# Patient Record
Sex: FEMALE | Race: WHITE | Employment: FULL TIME | URBAN - METROPOLITAN AREA
[De-identification: names, ages, dates, MRNs, and addresses within clinical notes are randomized per-mention and may not be internally consistent; named-entity substitution may affect disease eponyms.]

---

## 2020-02-26 ENCOUNTER — APPOINTMENT (OUTPATIENT)
Dept: OTHER | Facility: HOSPITAL | Age: 45
End: 2020-02-26
Attending: PREVENTIVE MEDICINE

## 2020-07-01 DIAGNOSIS — Z78.9 PARTICIPANT IN HEALTH AND WELLNESS PLAN: Primary | ICD-10-CM

## 2020-07-03 ENCOUNTER — NURSE ONLY (OUTPATIENT)
Dept: LAB | Age: 45
End: 2020-07-03
Attending: PREVENTIVE MEDICINE

## 2020-07-03 DIAGNOSIS — Z78.9 PARTICIPANT IN HEALTH AND WELLNESS PLAN: ICD-10-CM

## 2020-07-03 PROCEDURE — 86769 SARS-COV-2 COVID-19 ANTIBODY: CPT

## 2020-07-07 LAB — SARS-COV-2 IGG SERPLBLD QL IA.RAPID: NEGATIVE

## 2020-12-23 ENCOUNTER — IMMUNIZATION (OUTPATIENT)
Dept: LAB | Facility: HOSPITAL | Age: 45
End: 2020-12-23
Attending: PREVENTIVE MEDICINE

## 2020-12-23 DIAGNOSIS — Z23 NEED FOR VACCINATION: ICD-10-CM

## 2020-12-23 PROCEDURE — 0001A SARSCOV2 VAC 30MCG/0.3ML IM: CPT

## 2021-01-13 ENCOUNTER — IMMUNIZATION (OUTPATIENT)
Dept: LAB | Facility: HOSPITAL | Age: 46
End: 2021-01-13
Attending: PREVENTIVE MEDICINE

## 2021-01-13 DIAGNOSIS — Z23 NEED FOR VACCINATION: Primary | ICD-10-CM

## 2021-01-13 PROCEDURE — 0002A SARSCOV2 VAC 30MCG/0.3ML IM: CPT

## 2021-11-08 ENCOUNTER — EMPLOYEE HEALTH (OUTPATIENT)
Dept: OTHER | Facility: HOSPITAL | Age: 46
End: 2021-11-08
Attending: PREVENTIVE MEDICINE

## 2021-11-08 DIAGNOSIS — Z01.84 IMMUNITY STATUS TESTING: Primary | ICD-10-CM

## 2021-11-08 DIAGNOSIS — Z11.1 SCREENING-PULMONARY TB: ICD-10-CM

## 2021-11-08 PROCEDURE — 86787 VARICELLA-ZOSTER ANTIBODY: CPT

## 2021-11-08 PROCEDURE — 86480 TB TEST CELL IMMUN MEASURE: CPT

## 2022-02-07 PROCEDURE — 3044F HG A1C LEVEL LT 7.0%: CPT | Performed by: PHYSICIAN ASSISTANT

## 2022-03-16 PROBLEM — M65.241: Status: ACTIVE | Noted: 2019-09-17

## 2022-03-16 PROBLEM — E66.01 MORBID OBESITY DUE TO EXCESS CALORIES (HCC): Status: ACTIVE | Noted: 2017-01-19

## 2022-03-16 PROBLEM — M79.10 MYALGIA: Status: ACTIVE | Noted: 2018-11-05

## 2022-03-16 PROBLEM — E07.9 THYROID DISEASE: Status: ACTIVE | Noted: 2022-03-16

## 2022-03-16 PROBLEM — I10 HYPERTENSION: Status: ACTIVE | Noted: 2022-03-16

## 2022-04-25 ENCOUNTER — OFFICE VISIT (OUTPATIENT)
Dept: FAMILY MEDICINE CLINIC | Facility: CLINIC | Age: 47
End: 2022-04-25
Payer: COMMERCIAL

## 2022-04-25 VITALS
TEMPERATURE: 98 F | BODY MASS INDEX: 46.61 KG/M2 | HEART RATE: 77 BPM | HEIGHT: 66 IN | DIASTOLIC BLOOD PRESSURE: 80 MMHG | WEIGHT: 290 LBS | RESPIRATION RATE: 16 BRPM | SYSTOLIC BLOOD PRESSURE: 130 MMHG | OXYGEN SATURATION: 98 %

## 2022-04-25 DIAGNOSIS — J06.9 UPPER RESPIRATORY INFECTION, ACUTE: Primary | ICD-10-CM

## 2022-04-25 PROCEDURE — 3075F SYST BP GE 130 - 139MM HG: CPT | Performed by: PHYSICIAN ASSISTANT

## 2022-04-25 PROCEDURE — 99202 OFFICE O/P NEW SF 15 MIN: CPT | Performed by: PHYSICIAN ASSISTANT

## 2022-04-25 PROCEDURE — 3079F DIAST BP 80-89 MM HG: CPT | Performed by: PHYSICIAN ASSISTANT

## 2022-04-25 PROCEDURE — 3008F BODY MASS INDEX DOCD: CPT | Performed by: PHYSICIAN ASSISTANT

## 2022-05-21 ENCOUNTER — HOSPITAL ENCOUNTER (EMERGENCY)
Facility: HOSPITAL | Age: 47
Discharge: HOME | End: 2022-05-21
Attending: EMERGENCY MEDICINE
Payer: COMMERCIAL

## 2022-05-21 VITALS
TEMPERATURE: 98 F | RESPIRATION RATE: 17 BRPM | HEIGHT: 66 IN | BODY MASS INDEX: 45.16 KG/M2 | HEART RATE: 66 BPM | DIASTOLIC BLOOD PRESSURE: 69 MMHG | WEIGHT: 281 LBS | OXYGEN SATURATION: 96 % | SYSTOLIC BLOOD PRESSURE: 118 MMHG

## 2022-05-21 DIAGNOSIS — R10.13 EPIGASTRIC PAIN: Primary | ICD-10-CM

## 2022-05-21 DIAGNOSIS — S29.012A STRAIN OF THORACIC BACK REGION: ICD-10-CM

## 2022-05-21 LAB
ALBUMIN SERPL-MCNC: 3.4 G/DL (ref 3.4–5)
ALP LIVER SERPL-CCNC: 97 U/L
ALT SERPL-CCNC: 21 U/L
ANION GAP SERPL CALC-SCNC: 5 MMOL/L (ref 0–18)
AST SERPL-CCNC: 10 U/L (ref 15–37)
B-HCG UR QL: NEGATIVE
BASOPHILS # BLD AUTO: 0.13 X10(3) UL (ref 0–0.2)
BASOPHILS NFR BLD AUTO: 1.5 %
BILIRUB DIRECT SERPL-MCNC: <0.1 MG/DL (ref 0–0.2)
BILIRUB SERPL-MCNC: 0.2 MG/DL (ref 0.1–2)
BILIRUB UR QL: NEGATIVE
BUN BLD-MCNC: 12 MG/DL (ref 7–18)
BUN/CREAT SERPL: 16.9 (ref 10–20)
CALCIUM BLD-MCNC: 9.3 MG/DL (ref 8.5–10.1)
CHLORIDE SERPL-SCNC: 106 MMOL/L (ref 98–112)
CO2 SERPL-SCNC: 29 MMOL/L (ref 21–32)
COLOR UR: YELLOW
CREAT BLD-MCNC: 0.71 MG/DL
DEPRECATED RDW RBC AUTO: 42.7 FL (ref 35.1–46.3)
EOSINOPHIL # BLD AUTO: 0.31 X10(3) UL (ref 0–0.7)
EOSINOPHIL NFR BLD AUTO: 3.7 %
ERYTHROCYTE [DISTWIDTH] IN BLOOD BY AUTOMATED COUNT: 14.6 % (ref 11–15)
GLUCOSE BLD-MCNC: 89 MG/DL (ref 70–99)
GLUCOSE UR-MCNC: NEGATIVE MG/DL
HCT VFR BLD AUTO: 42.1 %
HGB BLD-MCNC: 13.3 G/DL
IMM GRANULOCYTES # BLD AUTO: 0.04 X10(3) UL (ref 0–1)
IMM GRANULOCYTES NFR BLD: 0.5 %
KETONES UR-MCNC: NEGATIVE MG/DL
LEUKOCYTE ESTERASE UR QL STRIP.AUTO: NEGATIVE
LIPASE SERPL-CCNC: 77 U/L (ref 73–393)
LYMPHOCYTES # BLD AUTO: 2.81 X10(3) UL (ref 1–4)
LYMPHOCYTES NFR BLD AUTO: 33.1 %
MCH RBC QN AUTO: 25.7 PG (ref 26–34)
MCHC RBC AUTO-ENTMCNC: 31.6 G/DL (ref 31–37)
MCV RBC AUTO: 81.3 FL
MONOCYTES # BLD AUTO: 0.5 X10(3) UL (ref 0.1–1)
MONOCYTES NFR BLD AUTO: 5.9 %
NEUTROPHILS # BLD AUTO: 4.69 X10 (3) UL (ref 1.5–7.7)
NEUTROPHILS # BLD AUTO: 4.69 X10(3) UL (ref 1.5–7.7)
NEUTROPHILS NFR BLD AUTO: 55.3 %
NITRITE UR QL STRIP.AUTO: NEGATIVE
OSMOLALITY SERPL CALC.SUM OF ELEC: 289 MOSM/KG (ref 275–295)
PH UR: 5 [PH] (ref 5–8)
PLATELET # BLD AUTO: 450 10(3)UL (ref 150–450)
POTASSIUM SERPL-SCNC: 3.8 MMOL/L (ref 3.5–5.1)
PROT SERPL-MCNC: 8.3 G/DL (ref 6.4–8.2)
PROT UR-MCNC: 30 MG/DL
RBC # BLD AUTO: 5.18 X10(6)UL
SODIUM SERPL-SCNC: 140 MMOL/L (ref 136–145)
SP GR UR STRIP: 1.02 (ref 1–1.03)
UROBILINOGEN UR STRIP-ACNC: <2
VIT C UR-MCNC: NEGATIVE MG/DL
WBC # BLD AUTO: 8.5 X10(3) UL (ref 4–11)

## 2022-05-21 PROCEDURE — 99284 EMERGENCY DEPT VISIT MOD MDM: CPT

## 2022-05-21 PROCEDURE — 81001 URINALYSIS AUTO W/SCOPE: CPT | Performed by: EMERGENCY MEDICINE

## 2022-05-21 PROCEDURE — 96374 THER/PROPH/DIAG INJ IV PUSH: CPT

## 2022-05-21 PROCEDURE — 83690 ASSAY OF LIPASE: CPT | Performed by: EMERGENCY MEDICINE

## 2022-05-21 PROCEDURE — 93005 ELECTROCARDIOGRAM TRACING: CPT

## 2022-05-21 PROCEDURE — 80048 BASIC METABOLIC PNL TOTAL CA: CPT | Performed by: EMERGENCY MEDICINE

## 2022-05-21 PROCEDURE — 80076 HEPATIC FUNCTION PANEL: CPT | Performed by: EMERGENCY MEDICINE

## 2022-05-21 PROCEDURE — 96361 HYDRATE IV INFUSION ADD-ON: CPT

## 2022-05-21 PROCEDURE — 81025 URINE PREGNANCY TEST: CPT

## 2022-05-21 PROCEDURE — 93010 ELECTROCARDIOGRAM REPORT: CPT | Performed by: EMERGENCY MEDICINE

## 2022-05-21 PROCEDURE — 85025 COMPLETE CBC W/AUTO DIFF WBC: CPT | Performed by: EMERGENCY MEDICINE

## 2022-05-21 RX ORDER — HYDROCODONE BITARTRATE AND ACETAMINOPHEN 5; 325 MG/1; MG/1
1 TABLET ORAL EVERY 6 HOURS PRN
Qty: 16 TABLET | Refills: 0 | Status: SHIPPED | OUTPATIENT
Start: 2022-05-21 | End: 2022-05-28

## 2022-05-21 RX ORDER — MORPHINE SULFATE 4 MG/ML
4 INJECTION, SOLUTION INTRAMUSCULAR; INTRAVENOUS ONCE
Status: COMPLETED | OUTPATIENT
Start: 2022-05-21 | End: 2022-05-21

## 2022-05-21 RX ORDER — IBUPROFEN 600 MG/1
600 TABLET ORAL EVERY 8 HOURS PRN
Qty: 30 TABLET | Refills: 0 | Status: SHIPPED | OUTPATIENT
Start: 2022-05-21 | End: 2022-05-28

## 2022-05-21 NOTE — ED INITIAL ASSESSMENT (HPI)
Pt to ED for dull epigastric ache since Tuesday worsening with movement, becoming sharp, radiating around to mid back. Pt states she ate [employer] on Tuesday before pain started and pain has not subsided.  Denies n/v/d.

## 2022-05-24 ENCOUNTER — LAB ENCOUNTER (OUTPATIENT)
Dept: LAB | Age: 47
End: 2022-05-24
Attending: PREVENTIVE MEDICINE
Payer: COMMERCIAL

## 2022-05-24 ENCOUNTER — TELEPHONE (OUTPATIENT)
Dept: INTERNAL MEDICINE CLINIC | Facility: HOSPITAL | Age: 47
End: 2022-05-24

## 2022-05-24 DIAGNOSIS — Z20.822 SUSPECTED COVID-19 VIRUS INFECTION: ICD-10-CM

## 2022-05-24 DIAGNOSIS — Z20.822 SUSPECTED COVID-19 VIRUS INFECTION: Primary | ICD-10-CM

## 2022-05-24 LAB — SARS-COV-2 RNA RESP QL NAA+PROBE: NOT DETECTED

## 2022-05-26 ENCOUNTER — LAB ENCOUNTER (OUTPATIENT)
Dept: LAB | Age: 47
End: 2022-05-26
Attending: PREVENTIVE MEDICINE
Payer: COMMERCIAL

## 2022-05-26 DIAGNOSIS — Z20.822 SUSPECTED COVID-19 VIRUS INFECTION: ICD-10-CM

## 2022-05-29 ENCOUNTER — LAB ENCOUNTER (OUTPATIENT)
Dept: LAB | Age: 47
End: 2022-05-29
Attending: PREVENTIVE MEDICINE
Payer: COMMERCIAL

## 2022-05-29 DIAGNOSIS — Z20.822 SUSPECTED COVID-19 VIRUS INFECTION: ICD-10-CM

## 2022-05-30 LAB — SARS-COV-2 RNA RESP QL NAA+PROBE: DETECTED

## 2022-05-31 ENCOUNTER — TELEPHONE (OUTPATIENT)
Dept: INTERNAL MEDICINE CLINIC | Facility: HOSPITAL | Age: 47
End: 2022-05-31

## 2022-05-31 NOTE — TELEPHONE ENCOUNTER
Results and RTW guidelines: employee stated she was originally swabbed for alinity test on 5/26, the lab called employee next day stating they lost the sample and employee retested again on 5/29. COVID RESULT:    [x] Viewed by employee in [facility]. RTW plan and instructions as indicated on triage call. Manager notified. Estimated RTW date:   [x] Discussed with employee   [] Unable to reach by phone. Sent via [employer] message      Test type:    [] Rapid         [x] Alinity         [] Outside test:       [x] Positive  Is employee unvaccinated? Yes []   No [x]          If yes:  Enter Covid Positive Medical exemption on Exemption Spreadsheet    Did you have close contact with someone on your unit while not wearing a mask? (e.g., during meal breaks):  Yes []   No []     If yes, who:     - Employee should quarantine at home for at least 5 days (day 1 is day after sx onset) , follow the CDC guidelines for cleaning and                              quarantining; see CDC.gov   -This employee may RTW on day 6 if asymptomatic or mildly symptomatic (with improving symptoms). Call Employee Health on day 5 if unable to return on day 6 after                      symptom onset.    -This employee needs to call Employee Health on day 5 after symptom onset. The employee needs to be cleared by Employee Health. - Monitor symptoms and temperature                 - Notify PCP of result                 - Seek emergent care with worsening symptoms   - If employee is still experiencing severe symptoms on day 5 must make a RTW appt with Employee Health, Employee will not be cleared if:    1. Has consistent cough, shortness of breath or fatigue that restricts your physical activities    2. Is still feeling \"unwell\"    3. Within 15 days of hospitalization for COVID    4. Within 20 days of intubation for COVID    5.  Still has a fever, vomiting or diarrhea   - Keep communication open with management about RTW and if symptoms worsen - If outside testing completed, bring a copy of result to RTW appointment           Notes:     RTW PLAN:    [x]  If COVID positive results, off work minimum of 5 days from positive test or onset of symptoms (day 0)        On day 5, if asymptomatic or mildly symptomatic (with improving symptoms) may return to work day 6          On day 5, if symptomatic, call Employee Health for RTW screening        []  COVID positive result - call Employee Health on day 5 after symptom onset. The employee needs to be cleared by Employee Health to RTW. [] RTW immediately, continue to monitor for sx  [] RTW when sx improve; must be fever free for 24 hours w/o medications, Diarrhea/Vomiting free for 24 hours w/o medications  [] Alinity ordered; continue to monitor sx and call for new/worsening sx.   Discuss RTW guidelines with manager  [] May continue to work  [x] Follow up with PCP  [] Home until further instruction from hotline with Alinity results  INSTRUCTIONS PROVIDED:  [x]  Plan as noted above  []  Length of time to obtain results   [x]  Quarantine instructions  [x]  Masking protocol   []  S/S of worsening infection/condition and importance of prompt medical re-evaluation including when to seek emergency care  [] If symptoms develop, stay home and call hotline for rapid test order    Estimated RTW date:  6/4    [x] The employee voiced understanding of above plan/instructions  [x] Manager Notified

## 2022-10-11 ENCOUNTER — IMMUNIZATION (OUTPATIENT)
Dept: LAB | Facility: HOSPITAL | Age: 47
End: 2022-10-11
Attending: PREVENTIVE MEDICINE
Payer: COMMERCIAL

## 2022-10-11 DIAGNOSIS — Z23 NEED FOR VACCINATION: Primary | ICD-10-CM

## 2022-10-11 PROCEDURE — 90471 IMMUNIZATION ADMIN: CPT

## 2022-12-13 NOTE — PROGRESS NOTES
Covid - DETECTED results to covid center Detail Level: Detailed Introduction Text (Please End With A Colon): Defer topical treatment on right oricle Size Of Lesion In Cm (Optional): 0

## 2023-04-26 ENCOUNTER — OFFICE VISIT (OUTPATIENT)
Dept: OBGYN CLINIC | Facility: CLINIC | Age: 48
End: 2023-04-26

## 2023-04-26 VITALS
WEIGHT: 263.81 LBS | HEART RATE: 92 BPM | BODY MASS INDEX: 43 KG/M2 | SYSTOLIC BLOOD PRESSURE: 129 MMHG | DIASTOLIC BLOOD PRESSURE: 86 MMHG

## 2023-04-26 DIAGNOSIS — N93.9 ABNORMAL UTERINE BLEEDING: Primary | ICD-10-CM

## 2023-04-26 PROCEDURE — 99202 OFFICE O/P NEW SF 15 MIN: CPT | Performed by: OBSTETRICS & GYNECOLOGY

## 2023-04-26 PROCEDURE — 3074F SYST BP LT 130 MM HG: CPT | Performed by: OBSTETRICS & GYNECOLOGY

## 2023-04-26 PROCEDURE — 3079F DIAST BP 80-89 MM HG: CPT | Performed by: OBSTETRICS & GYNECOLOGY

## 2023-04-26 RX ORDER — TIRZEPATIDE 5 MG/.5ML
5 INJECTION, SOLUTION SUBCUTANEOUS WEEKLY
COMMUNITY
Start: 2022-09-22

## 2023-05-07 ENCOUNTER — HOSPITAL ENCOUNTER (OUTPATIENT)
Dept: ULTRASOUND IMAGING | Age: 48
Discharge: HOME | End: 2023-05-07
Attending: OBSTETRICS & GYNECOLOGY
Payer: COMMERCIAL

## 2023-05-07 ENCOUNTER — HOSPITAL ENCOUNTER (OUTPATIENT)
Dept: ULTRASOUND IMAGING | Age: 48
End: 2023-05-07
Attending: OBSTETRICS & GYNECOLOGY
Payer: COMMERCIAL

## 2023-05-07 DIAGNOSIS — N93.9 ABNORMAL UTERINE BLEEDING: ICD-10-CM

## 2023-05-07 PROCEDURE — 76830 TRANSVAGINAL US NON-OB: CPT | Performed by: OBSTETRICS & GYNECOLOGY

## 2023-05-07 PROCEDURE — 76856 US EXAM PELVIC COMPLETE: CPT | Performed by: OBSTETRICS & GYNECOLOGY

## 2023-05-17 ENCOUNTER — TELEPHONE (OUTPATIENT)
Dept: OBGYN CLINIC | Facility: CLINIC | Age: 48
End: 2023-05-17

## 2023-05-17 NOTE — TELEPHONE ENCOUNTER
Patient needs EMBX. Reviewed procedure. Directed to take ibuprofen 600 mg 1 hour prior to procedure. HCG 1 day prior. [facility] [place] [facility].

## 2023-05-25 ENCOUNTER — OFFICE VISIT (OUTPATIENT)
Dept: OBGYN CLINIC | Facility: CLINIC | Age: 48
End: 2023-05-25

## 2023-05-25 VITALS
DIASTOLIC BLOOD PRESSURE: 86 MMHG | HEART RATE: 77 BPM | SYSTOLIC BLOOD PRESSURE: 145 MMHG | BODY MASS INDEX: 43 KG/M2 | WEIGHT: 264.38 LBS

## 2023-05-25 DIAGNOSIS — Z32.00 PREGNANCY EXAMINATION OR TEST, PREGNANCY UNCONFIRMED: Primary | ICD-10-CM

## 2023-05-25 DIAGNOSIS — N92.0 EXCESSIVE OR FREQUENT MENSTRUATION: ICD-10-CM

## 2023-05-25 LAB
CONTROL LINE PRESENT WITH A CLEAR BACKGROUND (YES/NO): YES YES/NO
KIT LOT #: NORMAL NUMERIC
PREGNANCY TEST, URINE: NEGATIVE

## 2023-05-25 PROCEDURE — 3077F SYST BP >= 140 MM HG: CPT | Performed by: OBSTETRICS & GYNECOLOGY

## 2023-05-25 PROCEDURE — 3079F DIAST BP 80-89 MM HG: CPT | Performed by: OBSTETRICS & GYNECOLOGY

## 2023-05-25 PROCEDURE — 81025 URINE PREGNANCY TEST: CPT | Performed by: OBSTETRICS & GYNECOLOGY

## 2023-05-25 PROCEDURE — 58100 BIOPSY OF UTERUS LINING: CPT | Performed by: OBSTETRICS & GYNECOLOGY

## 2023-05-25 NOTE — PROCEDURES
Endometrial Biopsy    Pre-Procedure Care:   Consent was obtained. Procedure/risks were explained. Questions were answered. Correct patient was identified. Correct side and site were confirmed. Pregnancy Results: negative from urine test   Birth control method(s) used:  ; date last used:      Pre-Medications: The patient was premedicated with Ibuprofen . Description of Procedure:  Under satisfactory analgesia, the patient was prepped and draped in the dorsal lithotomy position. A bivalve speculum was placed in the vagina and the cervix was prepped with Betadine solution. Single tooth tenaculum placed at the 12 o'clock position. The uterine cavity was sounded at 8 cm. The endometrial cavity was curetted for pipelle tissue sampling, 2 passes. Specimen was sent to pathology. The single tooth tenaculum was removed. Good hemostasis was noted. There were no complications. There was no blood loss. Discharge instructions were provided to the patient. Visit Plan:  Await final pathology prior to treatment.

## 2023-11-20 ENCOUNTER — HOSPITAL ENCOUNTER (OUTPATIENT)
Age: 48
Discharge: HOME | End: 2023-11-20
Payer: COMMERCIAL

## 2023-11-20 ENCOUNTER — APPOINTMENT (OUTPATIENT)
Dept: GENERAL RADIOLOGY | Age: 48
End: 2023-11-20
Attending: PHYSICIAN ASSISTANT
Payer: COMMERCIAL

## 2023-11-20 VITALS
WEIGHT: 280 LBS | DIASTOLIC BLOOD PRESSURE: 88 MMHG | BODY MASS INDEX: 45 KG/M2 | TEMPERATURE: 98 F | OXYGEN SATURATION: 97 % | SYSTOLIC BLOOD PRESSURE: 144 MMHG | HEART RATE: 76 BPM | RESPIRATION RATE: 18 BRPM
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
## 2023-11-20 DIAGNOSIS — M62.838 TRAPEZIUS MUSCLE SPASM: ICD-10-CM

## 2023-11-20 DIAGNOSIS — S40.011A CONTUSION OF RIGHT SHOULDER, INITIAL ENCOUNTER: ICD-10-CM

## 2023-11-20 DIAGNOSIS — S49.91XA INJURY OF RIGHT UPPER EXTREMITY, INITIAL ENCOUNTER: Primary | ICD-10-CM

## 2023-11-20 PROCEDURE — 73060 X-RAY EXAM OF HUMERUS: CPT | Performed by: PHYSICIAN ASSISTANT

## 2023-11-20 PROCEDURE — 73030 X-RAY EXAM OF SHOULDER: CPT | Performed by: PHYSICIAN ASSISTANT

## 2023-11-20 PROCEDURE — 99213 OFFICE O/P EST LOW 20 MIN: CPT | Performed by: PHYSICIAN ASSISTANT

## 2023-11-20 RX ORDER — CYCLOBENZAPRINE HCL 10 MG
10 TABLET ORAL 3 TIMES DAILY PRN
Qty: 20 TABLET | Refills: 0 | Status: SHIPPED | OUTPATIENT
Start: 2023-11-20 | End: 2023-11-27

## 2023-11-20 RX ORDER — IBUPROFEN 600 MG/1
600 TABLET ORAL ONCE
Status: COMPLETED | OUTPATIENT
Start: 2023-11-20 | End: 2023-11-20

## 2023-11-20 RX ORDER — NAPROXEN 500 MG/1
500 TABLET ORAL 2 TIMES DAILY PRN
Qty: 14 TABLET | Refills: 0 | Status: SHIPPED | OUTPATIENT
Start: 2023-11-20 | End: 2023-11-27

## 2023-11-20 NOTE — ED INITIAL ASSESSMENT (HPI)
Slipped on floor last noc. Tried to stop fall pain to right upper arm & shoulder. Denies hitting head or other injuries.

## 2023-11-21 NOTE — DISCHARGE INSTRUCTIONS
Moist warm compresses on the area  Naproxen twice a day as needed for pain  Use Flexeril as needed for muscle spasms will make you drowsy do not drive on medication  Follow-up with primary care doctor and your orthopedist  reTurn to the ER symptoms worsen

## 2023-11-26 ENCOUNTER — TELEPHONE (OUTPATIENT)
Dept: INTERNAL MEDICINE CLINIC | Facility: HOSPITAL | Age: 48
End: 2023-11-26

## 2023-11-26 ENCOUNTER — LAB ENCOUNTER (OUTPATIENT)
Dept: LAB | Age: 48
End: 2023-11-26
Attending: PREVENTIVE MEDICINE
Payer: COMMERCIAL

## 2023-11-26 DIAGNOSIS — Z20.822 SUSPECTED COVID-19 VIRUS INFECTION: ICD-10-CM

## 2023-11-26 DIAGNOSIS — Z20.822 SUSPECTED COVID-19 VIRUS INFECTION: Primary | ICD-10-CM

## 2023-11-26 LAB — SARS-COV-2 RNA RESP QL NAA+PROBE: NOT DETECTED

## 2023-11-26 NOTE — TELEPHONE ENCOUNTER
Outside Covid Testing done    Results and RTW guidelines:    COVID RESULT reported:      Test type:    [] Rapid outside         [] PCR outside       [x] Home Test    Date of test: 11/25     Test location: home         [] Result viewed in Epic with verbal consent received from employee     [x] Results per Employee Covid Dashboard    [] Employee instructed to email copy of result to [email or web address]       [] Discussed with employee     [x] Unable to reach by phone. Sent via [employer]      [x] NEGATIVE     Ordered Alinity retest?  []Yes   [x] No (skip to RTW)   Ordered Rapid retest?   [x]Yes   [] No (skip to RTW)        Dated to be taken:  11/26  If Yes, PLACE ORDER NOW and instruct the following:  -Originally Symptomatic or Now Symptoms:   -RTW when sx improve- fever free for 24 hours w/o medications, Diarrhea/Vomiting for 24 hours w/o medications     -Originally  Asymptomatic  -Asymptomatic AND Vaccinated or Unvaccinated or Prior infection in past 90 days:     -May work and continue to monitor symptoms for the next 14 days.                                           -Rapid test day 2, rapid test day 5 (day 0 - exposure)         Notes:     RTW PLAN:    []  If COVID positive results, off work minimum of 5 days from positive test or onset of symptoms (day 0)        On day 5, if asymptomatic or mildly symptomatic (with improving symptoms) may return to work day 6          On day 5, if symptomatic, call Employee Health for RTW screening        []  COVID positive result - call Employee Health on day 5 after symptom onset. The employee needs to be cleared by Employee Health to RTW. [] RTW immediately, continue to monitor for sx  [] RTW when sx improve; must be fever free for 24 hours w/o medications, Diarrhea/Vomiting free for 24 hours w/o medications  [] Alinity ordered; continue to monitor sx and call for new/worsening sx.   Discuss RTW guidelines with manager [x] Rapid ordered to confirm home negative. [] May continue to work  [] Follow up with PCP  [] Home until further instruction from hotline with Alinity results  INSTRUCTIONS PROVIDED:  [x]  Plan as noted above  [x]  Length of time to obtain results  []  May return to work if views negative in My Chart and  remains fever, vomiting, and diarrhea free  []  May continue to work if remains asymptomatic   []  Quarantine instructions  []  Masking protocol  []  S/S of worsening infection/condition and importance of prompt medical re-evaluation including when to seek emergency care  [] If symptoms develop, stay home and call hotline for rapid test order    Estimated RTW date:    [x] Manager notified        [x] [facility]  []ROSARIO   [] [address]  Manager : Vivian Dwyer COVID-19 Vaccine? Yes [x]    No []          If yes, date(s) received: 12/23/20; 1/13/21; 10/28/21           Which vaccine:  Pfizer [x]     Joe Duvall []    J&J []      SYMPTOMS (reported via dashboard):  [] asymptomatic  [x] symptomatic  [] GI symptoms only    Symptom onset date: 11/22    Fever   > 100F             Yes []      Cough                          Yes []      Shortness of breath  Yes []      Congestion                 Yes [x]      Runny nose                Yes []        Loss of Smell              Yes []        Loss of Taste             Yes []       Sore throat                 Yes []       Fatigue                        Yes []       Body Aches                Yes []        Chills                           Yes []        Headache                   Yes []             GI symptoms             Yes []     No [x]                     Nausea   []          Vomiting            []                                    Diarrhea  []          Upset stomach []      Employee has positive COVID Exposure?   Yes [x]     No []    Date of exposure: 11/21  []  Coworker                       [] patient                        [x] Family/friend    Employee has a history of Covid?   Yes [x]     No []   If Yes, when: 2021    When was the last shift you worked?: 11/22    Notes:

## 2023-11-27 NOTE — TELEPHONE ENCOUNTER
Results and RTW guidelines:    COVID RESULT:    [x] Viewed by employee in [address] RTW plan and instructions as indicated on triage call. Manager notified. Estimated RTW date:   [] Discussed with employee   [] Unable to reach by phone. Sent via [employer] message      Test type:    [x] Rapid         [] Alinity         [] Outside test:       [x] NEGATIVE     Ordered Alinity retest?  []Yes   [x] No (skip to RTW)   Ordered Rapid retest?   []Yes   [x] No (skip to RTW)           Dated to be taken:      If Yes, PLACE ORDER NOW and instruct the following:  -Originally Symptomatic or Now Symptoms:   -RTW when sx improve- fever free for 24 hours w/o medications, Diarrhea/Vomiting for 24 hours w/o medications    -Originally  Asymptomatic  -Asymptomatic AND Vaccinated or Unvaccinated or Prior infection in past 90 days:   -May work and continue to monitor symptoms for the next 10 days.                                         -Alinity test day 2, Alinity test day 5 (day 0 - exposure)        Notes:     RTW PLAN:    []  If COVID positive results, off work minimum of 5 days from positive test or onset of symptoms (day 0)        On day 5, if asymptomatic or mildly symptomatic (with improving symptoms) may return to work day 6          On day 5, if symptomatic, call Employee Health for RTW screening        []  COVID positive result - call Employee Health on day 5 after symptom onset. The employee needs to be cleared by Employee Health to RTW. [x] RTW immediately, continue to monitor for sx  [] RTW when sx improve; must be fever free for 24 hours w/o medications, Diarrhea/Vomiting free for 24 hours w/o medications  [] Alinity ordered; continue to monitor sx and call for new/worsening sx.   Discuss RTW guidelines with manager  [] May continue to work  [] Follow up with PCP  [] Home until further instruction from hotline with Alinity results  INSTRUCTIONS PROVIDED:  [x]  Plan as noted above  []  Length of time to obtain results   [] Quarantine instructions  []  Masking protocol   []  S/S of worsening infection/condition and importance of prompt medical re-evaluation including when to seek emergency care  [] If symptoms develop, stay home and call hotline for rapid test order    Estimated RTW date:      [] The employee voiced understanding of above plan/instructions  [x] Manager Notified

## 2023-12-23 ENCOUNTER — OFFICE VISIT (OUTPATIENT)
Dept: FAMILY MEDICINE CLINIC | Facility: CLINIC | Age: 48
End: 2023-12-23
Payer: COMMERCIAL

## 2023-12-23 VITALS
TEMPERATURE: 98 F | WEIGHT: 281.63 LBS | HEIGHT: 66 IN | HEART RATE: 75 BPM | SYSTOLIC BLOOD PRESSURE: 122 MMHG | RESPIRATION RATE: 20 BRPM | DIASTOLIC BLOOD PRESSURE: 88 MMHG | BODY MASS INDEX: 45.26 KG/M2 | OXYGEN SATURATION: 99 %

## 2023-12-23 DIAGNOSIS — H10.32 ACUTE BACTERIAL CONJUNCTIVITIS OF LEFT EYE: Primary | ICD-10-CM

## 2023-12-23 DIAGNOSIS — T15.92XA FOREIGN BODY OF LEFT EYE, INITIAL ENCOUNTER: ICD-10-CM

## 2023-12-23 RX ORDER — OFLOXACIN 3 MG/ML
1 SOLUTION/ DROPS OPHTHALMIC 4 TIMES DAILY
Qty: 1 EACH | Refills: 0 | Status: SHIPPED | OUTPATIENT
Start: 2023-12-23 | End: 2023-12-30

## 2023-12-23 RX ORDER — NALTREXONE HYDROCHLORIDE AND BUPROPION HYDROCHLORIDE 8; 90 MG/1; MG/1
2 TABLET, EXTENDED RELEASE ORAL 2 TIMES DAILY
COMMUNITY
Start: 2023-12-01 | End: 2023-12-31

## 2023-12-23 NOTE — PATIENT INSTRUCTIONS
In bacterial conjunctivitis you are contagious for 24 hours after starting antibiotic eye drops. Use prescribed eye drops as directed. Complete the medication even after symptoms have gone away. Launder your pillow case used last night and tomorrow morning. Do NOT wear contact lenses or eye makeup until all eye symptoms have resolved. Throw out any old contact lenses and start with a fresh pair after completing your eye drop prescription. Don't rub your eyes, as rubbing your eyes may make your symptoms worse. If your eyes are itching or painful it may help to place a cool compress over your eyes. Perform good hand hygiene. Follow up with primary care physician and eye doctor as needed. Seek emergency medical treatment for worsening of symptoms or eye pain, sudden vision changes or loss, increased eye drainage, significant swelling/redness/pain surrounding eye area.

## 2024-02-12 NOTE — ED QUICK NOTES
Patient presents with pain under bilateral breasts to back. Patient denies urinary symptoms, reports pain since Tuesday.
hide

## 2024-03-01 ENCOUNTER — OFFICE VISIT (OUTPATIENT)
Dept: INTERNAL MEDICINE CLINIC | Facility: CLINIC | Age: 49
End: 2024-03-01
Payer: COMMERCIAL

## 2024-03-01 VITALS
OXYGEN SATURATION: 98 % | HEART RATE: 74 BPM | BODY MASS INDEX: 45.16 KG/M2 | RESPIRATION RATE: 18 BRPM | SYSTOLIC BLOOD PRESSURE: 130 MMHG | DIASTOLIC BLOOD PRESSURE: 82 MMHG | HEIGHT: 66 IN | WEIGHT: 281 LBS | TEMPERATURE: 98 F

## 2024-03-01 DIAGNOSIS — Z12.31 ENCOUNTER FOR SCREENING MAMMOGRAM FOR MALIGNANT NEOPLASM OF BREAST: Primary | ICD-10-CM

## 2024-03-01 DIAGNOSIS — R21 RASH: ICD-10-CM

## 2024-03-01 DIAGNOSIS — E11.9 CONTROLLED TYPE 2 DIABETES MELLITUS WITHOUT COMPLICATION, WITHOUT LONG-TERM CURRENT USE OF INSULIN (HCC): ICD-10-CM

## 2024-03-01 DIAGNOSIS — E66.01 MORBID OBESITY DUE TO EXCESS CALORIES (HCC): ICD-10-CM

## 2024-03-01 DIAGNOSIS — Z13.0 SCREENING, ANEMIA, DEFICIENCY, IRON: ICD-10-CM

## 2024-03-01 DIAGNOSIS — E03.9 HYPOTHYROIDISM, UNSPECIFIED TYPE: ICD-10-CM

## 2024-03-01 DIAGNOSIS — I10 PRIMARY HYPERTENSION: ICD-10-CM

## 2024-03-01 DIAGNOSIS — F32.5 MAJOR DEPRESSIVE DISORDER IN FULL REMISSION, UNSPECIFIED WHETHER RECURRENT (HCC): ICD-10-CM

## 2024-03-01 PROBLEM — R80.9 MICROALBUMINURIA: Status: ACTIVE | Noted: 2023-10-17

## 2024-03-01 RX ORDER — NALTREXONE HYDROCHLORIDE AND BUPROPION HYDROCHLORIDE 8; 90 MG/1; MG/1
TABLET, EXTENDED RELEASE ORAL
COMMUNITY
Start: 2024-02-19

## 2024-03-01 RX ORDER — TRIAMCINOLONE ACETONIDE 1 MG/G
CREAM TOPICAL 2 TIMES DAILY PRN
Qty: 60 G | Refills: 1 | Status: SHIPPED | OUTPATIENT
Start: 2024-03-01

## 2024-03-01 RX ORDER — LEVOTHYROXINE SODIUM 88 UG/1
88 TABLET ORAL DAILY
Qty: 90 TABLET | Refills: 3 | Status: SHIPPED | OUTPATIENT
Start: 2024-03-01

## 2024-03-01 RX ORDER — LISINOPRIL 20 MG/1
20 TABLET ORAL DAILY
Qty: 90 TABLET | Refills: 3 | Status: SHIPPED | OUTPATIENT
Start: 2024-03-01

## 2024-03-01 NOTE — PROGRESS NOTES
Leeanna Sofia is a 48 year old female.   Chief Complaint   Patient presents with    Establish Care     LE Rm 10- Pt is here to establish care     HPI:    Pt presents to establish care.     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    with 1 son.     DM. Diagnosed 2-3 years ago. Currently controlled with mounjaro 12.5 mg weekly, metformin 1500 mg daily, and farxiga 5 mg daily. Last A1c was checked 9/2023 and was 5.4%  H/o gestational diabetes as well.   [facility] exam - [facility]. She thinks last exam was 1 year ago.     Anxiety. She takes zoloft 100 mg daily with good control of her anxiety. High stress this past year due to her  losing his job and son is struggling with mental health. Overall she feels she is coping well.     HTN.   Takes lisinopril 20 mg daily.   Denies CP, SOB, HA, and LE edema.     Unsure of last pap. She saw Dr Campbell earlier this year for DUB but unsure if pap was completed. She did have an endometrial biopsy which was normal.     Completed colonoscopy at [facility] recently.     Rash to right arm, right trunk, and right leg. Very itchy. H/o eczema. Denies new lotions, soaps, detergents, etc. She has tried using otc hydrocortisone with limited benefit.     Allergies:  No Known Allergies   Current Meds:  Current Outpatient Medications   Medication Sig Dispense Refill    CONTRAVE 8-90 MG Oral Tablet 12 Hr TAKE TWO TABLETS BY MOUTH TWO (TWO) TIMES A DAY.      triamcinolone 0.1 % External Cream Apply topically 2 (two) times daily as needed. 60 g 1    levothyroxine 88 MCG Oral Tab Take 1 tablet (88 mcg total) by mouth daily. 90 tablet 3    lisinopril 20 MG Oral Tab Take 1 tablet (20 mg total) by mouth daily. 90 tablet 3    dapagliflozin 5 MG Oral Tab Take 1 tablet (5 mg total) by mouth daily.      Tirzepatide (MOUNJARO) 5 MG/0.5ML Subcutaneous Solution Pen-injector Inject 5 mg into the skin once a week.      metFORMIN HCl  MG Oral Tablet 24 Hr Take 2 tablets (1,500 mg total)  by mouth daily with breakfast. 60 tablet 2    Diethylpropion HCl ER 75 MG Oral Tablet 24 Hr Take 1 tablet (75 mg total) by mouth daily. 30 tablet 0    Blood Glucose Monitoring Suppl (ACCU-CHEK GUIDE) w/Device Does not apply Kit 1 lancet by Finger stick route 2 (two) times daily.      Blood Glucose Monitoring Suppl (PRECISION XTRA) Does not apply Device 1 Application 2 (two) times daily.      Glucose Blood (ACCU-CHEK GUIDE) In Vitro Strip 1 STRIP BY MIS. (NON DRUG COMBO ROUTE) ROUTE DAILY.      Sertraline HCl 100 MG Oral Tab TAKE 1 TABLET BY MOUTH EVERY DAY          PMH:     Past Medical History:   Diagnosis Date    SHAUN (obstructive sleep apnea) 10/22/2021 HST    AHI 15.1 Supine AHI 15 non-supine AHI 47.1       ROS:   GENERAL: Negative for fever, chills and fatigue. NAD.  HENT: Negative for congestion, sore throat, and ear pain.  RESPIRATORY: Negative for cough, chest tightness, shortness of breath and wheezing.    CV: Negative for chest pain, palpitations and leg swelling.   GI: Negative for nausea, vomiting, abdominal pain, diarrhea, and blood in stool.   : Negative for dysuria, hematuria and difficulty urinating.   MUSCULOSKELETAL: Negative for myalgias, back pain, joint swelling, arthralgias and gait problem.   NEURO: Negative for dizziness, syncope, weakness, numbness, tingling and headaches.   PSYCH: +anxiety       PHYSICAL EXAM:    /82   Pulse 74   Temp 97.9 °F (36.6 °C) (Temporal)   Resp 18   Ht 5' 6\" (1.676 m)   Wt 281 lb (127.5 kg)   LMP 02/15/2024 (Exact Date)   SpO2 98%   BMI 45.35 kg/m²     GENERAL: NAD. A&Ox3  SKIN: rough textured erythematous flaking rash to right arm, trunk, and right leg, +excoriations   HEENT: Ear canals clear, TMs pearly gray bilaterally. Throat without erythema or exudates.  NECK: No LAD.   RESPIRATORY: CTAB, no R/R/W  CV: RRR, no murmurs.   ABD: Soft, non-tender, non-distended. +bowel sounds. No rebound tenderness.   LE: Bilateral barefoot skin diabetic exam is  normal, visualized feet and the appearance is normal.  Bilateral monofilament/sensation of both feet is normal.  Pulsation pedal pulse exam of both lower legs/feet is normal as well.  PSYCH: Appropriate mood and affect.      ASSESSMENT/ PLAN:   1. Encounter for screening mammogram for malignant neoplasm of breast  - [facility] TERESSA 2D+3D SCREENING BILAT (CPT=77067/37427); Future    2. Rash  ?eczema   Trial of triamcinolone bid x 2 weeks, then prn   Derm if persists    3. Morbid obesity due to excess calories (HCC)  Working with weight loss clinic at [facility]     4. Primary hypertension  Controlled with lisinopril   CPM    5. Hypothyroidism, unspecified type  Takes levothyroxine 88 mcg daily   Check labs     6. Controlled type 2 diabetes mellitus without complication, without long-term current use of insulin (HCC)  Due for labs this month - orders placed   Advised to schedule eye exam   [facility] also [facility] to try to obtain eye exam records     7. Major depressive disorder in full remission, unspecified whether recurrent (HCC)  Well controlled with zoloft 100 mg daily   CPM       Health Maintenance Due   Topic Date Due    Diabetes Care Foot Exam  Never done    Diabetes Care Dilated Eye Exam  Never done    Colorectal Cancer Screening  Never done    Diabetes Care: Microalb/Creat Ratio  Never done    Annual Physical  Never done    Pap Smear  Never done    Diabetes Care A1C  08/07/2022    Diabetes Care: GFR  05/21/2023    COVID-19 Vaccine (4 - 2023-24 season) 09/01/2023    Influenza Vaccine (1) 10/01/2023    Annual Depression Screening  01/01/2024    Mammogram  02/04/2024           Pt indicates understanding and agrees to the plan.     Return in about 6 months (around 9/1/2024) for physical.    Evangelina Lagos PA-C

## 2024-03-08 ENCOUNTER — LAB ENCOUNTER (OUTPATIENT)
Dept: LAB | Age: 49
End: 2024-03-08
Attending: PHYSICIAN ASSISTANT
Payer: COMMERCIAL

## 2024-03-08 DIAGNOSIS — Z13.0 SCREENING, ANEMIA, DEFICIENCY, IRON: ICD-10-CM

## 2024-03-08 DIAGNOSIS — E11.9 CONTROLLED TYPE 2 DIABETES MELLITUS WITHOUT COMPLICATION, WITHOUT LONG-TERM CURRENT USE OF INSULIN (HCC): ICD-10-CM

## 2024-03-08 DIAGNOSIS — E03.9 HYPOTHYROIDISM, UNSPECIFIED TYPE: ICD-10-CM

## 2024-03-08 DIAGNOSIS — I10 PRIMARY HYPERTENSION: ICD-10-CM

## 2024-03-08 LAB
ALBUMIN SERPL-MCNC: 3.4 G/DL (ref 3.4–5)
ALBUMIN/GLOB SERPL: 0.7 {RATIO} (ref 1–2)
ALP LIVER SERPL-CCNC: 102 U/L
ALT SERPL-CCNC: 20 U/L
ANION GAP SERPL CALC-SCNC: 7 MMOL/L (ref 0–18)
AST SERPL-CCNC: 9 U/L (ref 15–37)
BASOPHILS # BLD AUTO: 0.1 X10(3) UL (ref 0–0.2)
BASOPHILS NFR BLD AUTO: 1.1 %
BILIRUB SERPL-MCNC: 0.4 MG/DL (ref 0.1–2)
BUN BLD-MCNC: 9 MG/DL (ref 9–23)
CALCIUM BLD-MCNC: 9.4 MG/DL (ref 8.5–10.1)
CHLORIDE SERPL-SCNC: 104 MMOL/L (ref 98–112)
CHOLEST SERPL-MCNC: 210 MG/DL (ref ?–200)
CO2 SERPL-SCNC: 25 MMOL/L (ref 21–32)
CREAT BLD-MCNC: 0.7 MG/DL
CREAT UR-SCNC: 99.9 MG/DL
EGFRCR SERPLBLD CKD-EPI 2021: 107 ML/MIN/1.73M2 (ref 60–?)
EOSINOPHIL # BLD AUTO: 0.25 X10(3) UL (ref 0–0.7)
EOSINOPHIL NFR BLD AUTO: 2.7 %
ERYTHROCYTE [DISTWIDTH] IN BLOOD BY AUTOMATED COUNT: 15.8 %
EST. AVERAGE GLUCOSE BLD GHB EST-MCNC: 123 MG/DL (ref 68–126)
FASTING PATIENT LIPID ANSWER: YES
FASTING STATUS PATIENT QL REPORTED: YES
GLOBULIN PLAS-MCNC: 4.6 G/DL (ref 2.8–4.4)
GLUCOSE BLD-MCNC: 95 MG/DL (ref 70–99)
HBA1C MFR BLD: 5.9 % (ref ?–5.7)
HCT VFR BLD AUTO: 40.6 %
HDLC SERPL-MCNC: 73 MG/DL (ref 40–59)
HGB BLD-MCNC: 13.1 G/DL
IMM GRANULOCYTES # BLD AUTO: 0.05 X10(3) UL (ref 0–1)
IMM GRANULOCYTES NFR BLD: 0.5 %
LDLC SERPL CALC-MCNC: 122 MG/DL (ref ?–100)
LYMPHOCYTES # BLD AUTO: 2.24 X10(3) UL (ref 1–4)
LYMPHOCYTES NFR BLD AUTO: 23.9 %
MCH RBC QN AUTO: 24.9 PG (ref 26–34)
MCHC RBC AUTO-ENTMCNC: 32.3 G/DL (ref 31–37)
MCV RBC AUTO: 77.2 FL
MICROALBUMIN UR-MCNC: 2.79 MG/DL
MICROALBUMIN/CREAT 24H UR-RTO: 27.9 UG/MG (ref ?–30)
MONOCYTES # BLD AUTO: 0.63 X10(3) UL (ref 0.1–1)
MONOCYTES NFR BLD AUTO: 6.7 %
NEUTROPHILS # BLD AUTO: 6.09 X10 (3) UL (ref 1.5–7.7)
NEUTROPHILS # BLD AUTO: 6.09 X10(3) UL (ref 1.5–7.7)
NEUTROPHILS NFR BLD AUTO: 65.1 %
NONHDLC SERPL-MCNC: 137 MG/DL (ref ?–130)
OSMOLALITY SERPL CALC.SUM OF ELEC: 280 MOSM/KG (ref 275–295)
PLATELET # BLD AUTO: 433 10(3)UL (ref 150–450)
POTASSIUM SERPL-SCNC: 4.4 MMOL/L (ref 3.5–5.1)
PROT SERPL-MCNC: 8 G/DL (ref 6.4–8.2)
RBC # BLD AUTO: 5.26 X10(6)UL
SODIUM SERPL-SCNC: 136 MMOL/L (ref 136–145)
TRIGL SERPL-MCNC: 86 MG/DL (ref 30–149)
TSI SER-ACNC: 2.15 MIU/ML (ref 0.36–3.74)
VLDLC SERPL CALC-MCNC: 15 MG/DL (ref 0–30)
WBC # BLD AUTO: 9.4 X10(3) UL (ref 4–11)

## 2024-03-08 PROCEDURE — 36415 COLL VENOUS BLD VENIPUNCTURE: CPT

## 2024-03-08 PROCEDURE — 80061 LIPID PANEL: CPT

## 2024-03-08 PROCEDURE — 82043 UR ALBUMIN QUANTITATIVE: CPT

## 2024-03-08 PROCEDURE — 84443 ASSAY THYROID STIM HORMONE: CPT

## 2024-03-08 PROCEDURE — 82570 ASSAY OF URINE CREATININE: CPT

## 2024-03-08 PROCEDURE — 83036 HEMOGLOBIN GLYCOSYLATED A1C: CPT

## 2024-03-08 PROCEDURE — 85025 COMPLETE CBC W/AUTO DIFF WBC: CPT

## 2024-03-08 PROCEDURE — 80053 COMPREHEN METABOLIC PANEL: CPT

## 2024-03-13 DIAGNOSIS — R71.8 MICROCYTOSIS: Primary | ICD-10-CM

## 2024-03-14 ENCOUNTER — HOSPITAL ENCOUNTER (OUTPATIENT)
Age: 49
Discharge: HOME | End: 2024-03-14
Payer: COMMERCIAL

## 2024-03-14 VITALS
TEMPERATURE: 97 F | HEART RATE: 82 BPM | DIASTOLIC BLOOD PRESSURE: 81 MMHG | SYSTOLIC BLOOD PRESSURE: 156 MMHG | RESPIRATION RATE: 18 BRPM | OXYGEN SATURATION: 97 %

## 2024-03-14 DIAGNOSIS — J02.9 SORE THROAT: Primary | ICD-10-CM

## 2024-03-14 DIAGNOSIS — J04.0 ACUTE LARYNGITIS: ICD-10-CM

## 2024-03-14 DIAGNOSIS — Z20.818 STREP THROAT EXPOSURE: ICD-10-CM

## 2024-03-14 LAB — S PYO AG THROAT QL: NEGATIVE

## 2024-03-14 PROCEDURE — 87081 CULTURE SCREEN ONLY: CPT | Performed by: NURSE PRACTITIONER

## 2024-03-14 RX ORDER — LIDOCAINE HYDROCHLORIDE 20 MG/ML
10 SOLUTION OROPHARYNGEAL ONCE
Status: COMPLETED | OUTPATIENT
Start: 2024-03-14 | End: 2024-03-14

## 2024-03-14 RX ORDER — LIDOCAINE HYDROCHLORIDE 20 MG/ML
5 SOLUTION OROPHARYNGEAL
Qty: 100 ML | Refills: 0 | Status: SHIPPED | OUTPATIENT
Start: 2024-03-14

## 2024-03-14 NOTE — DISCHARGE INSTRUCTIONS
Viscous lidocaine as needed for sore throat  If your culture turns positive, we will contact you and call in a prescription for antibiotics      Upper respiratory infection supportive care measures to try as applicable:  General:   - Wash hands often   - Disinfect your environment, linens, electronics, etc.   - Drink plenty of fluids (water, Pedialyte, etc.)   - Get plenty of rest and sleep with head elevated to help with sinus drainage and throat irritation   - Avoid having air blow on your face as this can worsen congestion / cough   - Do not share utensils or drinks   - Alternate Ibuprofen (adult: 600mg) and Tylenol (adult: 650-1000mg) as needed for pain / body aches / fever   - Symptoms may take a few weeks to resolve   - You may benefit from taking a daily multivitamin   - You may benefit from Zinc (~20mg) every other day for a week while sick   - You may benefit from Vitamin D daily (~2000u)   - Change toothbrush    Sore throat:   - Salt water gargles throughout the day for sore throat   - Cepacol lozenges as needed for sore throat    Cough / Sinus:   - You may benefit from spoonfuls (and/or added to warm drinks) of honey throughout the day for cough   - You may benefit from taking a decongestant (e.g. Sudafed - pseudoephedrine [behind the pharmacy counter]) (may temporarily elevate your heart rate and blood pressure)   - You may benefit from using a humidifier and/or steam showers   - You may benefit from Flonase nasal spray daily   - You may benefit from taking a daily allergy medication (e.g. Zyrtec, Xyzal, etc.)   - You may benefit from boiling water with lemon and cayenne pepper, then breathing in the steam (you can cover your head with a towel to help funnel the steam)

## 2024-03-14 NOTE — ED PROVIDER NOTES
History     Chief Complaint   Patient presents with    Hoarseness    Cough/URI    Sore Throat       Subjective:   HPI    Leeanna Sofia, 48 year old female with notable medical history of HTN, obesity who presents with sore throat and losing voice. Patient reports s/s started today, with her throat pain worsening when coughing and swallowing. Denies fever, chills, body aches. Patient reports her son was just diagnosed with strep yesterday prior to her symptoms starting.         Objective:   Past Medical History:   Diagnosis Date    SHAUN (obstructive sleep apnea) 10/22/2021 HST    AHI 15.1 Supine AHI 15 non-supine AHI 47.1              No pertinent past surgical history.              No pertinent social history.            No current facility-administered medications on file prior to encounter.     Current Outpatient Medications on File Prior to Encounter   Medication Sig Dispense Refill    CONTRAVE 8-90 MG Oral Tablet 12 Hr TAKE TWO TABLETS BY MOUTH TWO (TWO) TIMES A DAY.      triamcinolone 0.1 % External Cream Apply topically 2 (two) times daily as needed. 60 g 1    levothyroxine 88 MCG Oral Tab Take 1 tablet (88 mcg total) by mouth daily. 90 tablet 3    lisinopril 20 MG Oral Tab Take 1 tablet (20 mg total) by mouth daily. 90 tablet 3    dapagliflozin 5 MG Oral Tab Take 1 tablet (5 mg total) by mouth daily.      [] Naltrexone-buPROPion HCl ER (CONTRAVE) 8-90 MG Oral Tablet 12 Hr Take 2 tablets by mouth 2 (two) times daily.      [] ofloxacin 0.3 % Ophthalmic Solution Place 1 drop into the left eye 4 (four) times daily for 7 days. 1 each 0    Tirzepatide (MOUNJARO) 5 MG/0.5ML Subcutaneous Solution Pen-injector Inject 5 mg into the skin once a week.      metFORMIN HCl  MG Oral Tablet 24 Hr Take 2 tablets (1,500 mg total) by mouth daily with breakfast. 60 tablet 2    Diethylpropion HCl ER 75 MG Oral Tablet 24 Hr Take 1 tablet (75 mg total) by mouth daily. 30 tablet 0    Blood Glucose  Monitoring Suppl (ACCU-CHEK GUIDE) w/Device Does not apply Kit 1 lancet by Finger stick route 2 (two) times daily.      Blood Glucose Monitoring Suppl (PRECISION XTRA) Does not apply Device 1 Application 2 (two) times daily.      Glucose Blood (ACCU-CHEK GUIDE) In Vitro Strip 1 STRIP BY [facility]. (NON DRUG COMBO ROUTE) ROUTE DAILY.      Sertraline HCl 100 MG Oral Tab TAKE 1 TABLET BY MOUTH EVERY DAY           Review of Systems   HENT:  Positive for sore throat and voice change.    Respiratory:  Positive for cough. Negative for shortness of breath.    All other systems reviewed and are negative.        Constitutional and vital signs reviewed.      All other systems reviewed and negative except as noted above.    I have reviewed the family history, social history, allergies, and outpatient medications.     History reviewed from EMR: Encounters, problem list, allergies, medications      Physical Exam     ED Triage Vitals [03/14/24 1712]   /81   Pulse 82   Resp 18   Temp 97.2 °F (36.2 °C)   Temp src Temporal   SpO2 97 %   O2 Device None (Room air)       Current:/81   Pulse 82   Temp 97.2 °F (36.2 °C) (Temporal)   Resp 18   LMP 02/15/2024 (Exact Date)   SpO2 97%       Physical Exam  Vitals and nursing note reviewed.   Constitutional:       General: She is not in acute distress.     Appearance: Normal appearance. She is obese. She is not ill-appearing or toxic-appearing.   HENT:      Head: Normocephalic and atraumatic.      Right Ear: External ear normal.      Left Ear: External ear normal.      Nose: Nose normal.      Mouth/Throat:      Mouth: Mucous membranes are moist.      Pharynx: Oropharynx is clear. Uvula midline. No pharyngeal swelling, oropharyngeal exudate or posterior oropharyngeal erythema.      Tonsils: No tonsillar exudate. 0 on the right. 0 on the left.      Comments: Benign oropharynx. Airway patent.  Eyes:      Extraocular Movements: Extraocular movements intact.      Conjunctiva/sclera:  Conjunctivae normal.      Pupils: Pupils are equal, round, and reactive to light.   Cardiovascular:      Rate and Rhythm: Normal rate.      Pulses: Normal pulses.   Pulmonary:      Effort: Pulmonary effort is normal. No respiratory distress.   Musculoskeletal:         General: No swelling, tenderness or signs of injury. Normal range of motion.      Cervical back: Normal range of motion and neck supple.   Skin:     General: Skin is warm and dry.      Capillary Refill: Capillary refill takes less than 2 seconds.      Coloration: Skin is not jaundiced.   Neurological:      General: No focal deficit present.      Mental Status: She is alert and oriented to person, place, and time. Mental status is at baseline.   Psychiatric:         Mood and Affect: Mood normal.         Behavior: Behavior normal.         Thought Content: Thought content normal.         Judgment: Judgment normal.            ED Course     Labs Reviewed   POCT RAPID STREP - Normal   GRP A STREP CULT, THROAT     No orders to display       Vitals:    03/14/24 1712   BP: 156/81   Pulse: 82   Resp: 18   Temp: 97.2 °F (36.2 °C)   TempSrc: Temporal   SpO2: 97%            [facility]        Leeanna Sofia, 48 year old female with medical history as noted above who presents with sore throat, laryngitis, strep exposure   - Patient in NAD, vss   - strep vs viral vs other   - Strep swab obtained   - viscous lidocaine ordered        ** See ED course below for additional information on care provided / interventions / notable events throughout patient's encounter.    ED Course as of 03/14/24 1818  ------------------------------------------------------------  Time: 03/14 1815  Comment: Strep negative. Given close exposure to son with strep, will send culture  Supportive care and infection control measures discussed  F/u with primary care provider as needed       ** I have independently reviewed the radiology images, clinical lab results, and ECG tracings as described  above (if applicable)    ** See below for home care instructions (if applicable)          Medical Decision Making  Amount and/or Complexity of Data Reviewed  Labs: ordered. Decision-making details documented in ED Course.    Risk  OTC drugs.  Prescription drug management.        Disposition and Plan     Clinical Impression:  1. Sore throat    2. Strep throat exposure    3. Acute laryngitis         Disposition:  Discharge  3/14/2024  6:18 pm    Follow-up:  Neri Escobar,   [address]  [address] [address]  [phone]      As needed          Medications Prescribed:  Current Discharge Medication List        START taking these medications    Details   Lidocaine Viscous HCl 2 % Mouth/Throat Solution Take 5 mL by mouth every 3 (three) hours as needed for Pain. Spit or swallow  Qty: 100 mL, Refills: 0             The above patient (and/or guardian) was made aware that an appropriate evaluation has been performed, and that no additional testing is required at this time. In my medical judgment, there is currently no evidence of an immediate life-threatening or surgical condition, therefore discharge is indicated at this time. The patient (and/or guardian) was advised that a small risk still exists that a serious condition could develop. The patient was instructed to arrange close follow-up with their primary care provider (or the referral provider given today). The patient received written and verbal instructions regarding their condition / concerns, demonstrated understanding, and is agreement with the outpatient treatment plan.        Home care instructions:    Viscous lidocaine as needed for sore throat  If your culture turns positive, we will contact you and call in a prescription for antibiotics      Upper respiratory infection supportive care measures to try as applicable:  General:   - Wash hands often   - Disinfect your environment, linens, electronics, etc.   - Drink plenty of fluids (water,  Pedialyte, etc.)   - Get plenty of rest and sleep with head elevated to help with sinus drainage and throat irritation   - Avoid having air blow on your face as this can worsen congestion / cough   - Do not share utensils or drinks   - Alternate Ibuprofen (adult: 600mg) and Tylenol (adult: 650-1000mg) as needed for pain / body aches / fever   - Symptoms may take a few weeks to resolve   - You may benefit from taking a daily multivitamin   - You may benefit from Zinc (~20mg) every other day for a week while sick   - You may benefit from Vitamin D daily (~2000u)   - Change toothbrush    Sore throat:   - Salt water gargles throughout the day for sore throat   - Cepacol lozenges as needed for sore throat    Cough / Sinus:   - You may benefit from spoonfuls (and/or added to warm drinks) of honey throughout the day for cough   - You may benefit from taking a decongestant (e.g. Sudafed - pseudoephedrine [behind the pharmacy counter]) (may temporarily elevate your heart rate and blood pressure)   - You may benefit from using a humidifier and/or steam showers   - You may benefit from Flonase nasal spray daily   - You may benefit from taking a daily allergy medication (e.g. Zyrtec, Xyzal, etc.)   - You may benefit from boiling water with lemon and cayenne pepper, then breathing in the steam (you can cover your head with a towel to help funnel the steam)      Osman Sim, DNP, APRN, AGACNP-BC, FNP-C, CNL  Adult-Gerontology Acute Care & Family Nurse Practitioner  [facility]

## 2024-03-15 DIAGNOSIS — E78.5 HYPERLIPIDEMIA, UNSPECIFIED HYPERLIPIDEMIA TYPE: Primary | ICD-10-CM

## 2024-03-15 RX ORDER — ATORVASTATIN CALCIUM 10 MG/1
10 TABLET, FILM COATED ORAL NIGHTLY
Qty: 90 TABLET | Refills: 1 | Status: SHIPPED | OUTPATIENT
Start: 2024-03-15 | End: 2025-03-10

## 2024-03-22 ENCOUNTER — APPOINTMENT (OUTPATIENT)
Dept: CT IMAGING | Age: 49
End: 2024-03-22
Attending: EMERGENCY MEDICINE
Payer: COMMERCIAL

## 2024-03-22 ENCOUNTER — APPOINTMENT (OUTPATIENT)
Dept: GENERAL RADIOLOGY | Age: 49
End: 2024-03-22
Attending: EMERGENCY MEDICINE
Payer: COMMERCIAL

## 2024-03-22 ENCOUNTER — HOSPITAL ENCOUNTER (OUTPATIENT)
Age: 49
Discharge: HOME | End: 2024-03-22
Attending: EMERGENCY MEDICINE
Payer: COMMERCIAL

## 2024-03-22 VITALS
RESPIRATION RATE: 18 BRPM | DIASTOLIC BLOOD PRESSURE: 87 MMHG | OXYGEN SATURATION: 98 % | HEART RATE: 65 BPM | SYSTOLIC BLOOD PRESSURE: 147 MMHG | TEMPERATURE: 98 F

## 2024-03-22 DIAGNOSIS — J06.9 UPPER RESPIRATORY TRACT INFECTION, UNSPECIFIED TYPE: Primary | ICD-10-CM

## 2024-03-22 LAB
#MXD IC: 0.6 X10ˆ3/UL (ref 0.1–1)
ATRIAL RATE: 70 BPM
BUN BLD-MCNC: 10 MG/DL (ref 7–18)
CHLORIDE BLD-SCNC: 105 MMOL/L (ref 98–112)
CO2 BLD-SCNC: 26 MMOL/L (ref 21–32)
CREAT BLD-MCNC: 0.6 MG/DL
DDIMER WHOLE BLOOD: 455 NG/ML DDU (ref ?–400)
EGFRCR SERPLBLD CKD-EPI 2021: 111 ML/MIN/1.73M2 (ref 60–?)
GLUCOSE BLD-MCNC: 89 MG/DL (ref 70–99)
HCT VFR BLD AUTO: 38.5 %
HCT VFR BLD CALC: 39 %
HGB BLD-MCNC: 12.3 G/DL
ISTAT IONIZED CALCIUM FOR CHEM 8: 1.18 MMOL/L (ref 1.12–1.32)
LYMPHOCYTES # BLD AUTO: 1.8 X10ˆ3/UL (ref 1–4)
LYMPHOCYTES NFR BLD AUTO: 18.5 %
MCH RBC QN AUTO: 24.7 PG (ref 26–34)
MCHC RBC AUTO-ENTMCNC: 31.9 G/DL (ref 31–37)
MCV RBC AUTO: 77.3 FL (ref 80–100)
MIXED CELL %: 6.5 %
NEUTROPHILS # BLD AUTO: 7.3 X10ˆ3/UL (ref 1.5–7.7)
NEUTROPHILS NFR BLD AUTO: 75 %
P AXIS: 1 DEGREES
P-R INTERVAL: 128 MS
PLATELET # BLD AUTO: 459 X10ˆ3/UL (ref 150–450)
POTASSIUM BLD-SCNC: 3.8 MMOL/L (ref 3.6–5.1)
Q-T INTERVAL: 416 MS
QRS DURATION: 82 MS
QTC CALCULATION (BEZET): 449 MS
R AXIS: 27 DEGREES
RBC # BLD AUTO: 4.98 X10ˆ6/UL
SODIUM BLD-SCNC: 142 MMOL/L (ref 136–145)
T AXIS: 12 DEGREES
TROPONIN I BLD-MCNC: <0.02 NG/ML
VENTRICULAR RATE: 70 BPM
WBC # BLD AUTO: 9.7 X10ˆ3/UL (ref 4–11)

## 2024-03-22 PROCEDURE — 71260 CT THORAX DX C+: CPT | Performed by: EMERGENCY MEDICINE

## 2024-03-22 PROCEDURE — 93005 ELECTROCARDIOGRAM TRACING: CPT

## 2024-03-22 PROCEDURE — 99214 OFFICE O/P EST MOD 30 MIN: CPT

## 2024-03-22 PROCEDURE — 85378 FIBRIN DEGRADE SEMIQUANT: CPT | Performed by: EMERGENCY MEDICINE

## 2024-03-22 PROCEDURE — 36415 COLL VENOUS BLD VENIPUNCTURE: CPT

## 2024-03-22 PROCEDURE — 93010 ELECTROCARDIOGRAM REPORT: CPT

## 2024-03-22 PROCEDURE — 85025 COMPLETE CBC W/AUTO DIFF WBC: CPT | Performed by: EMERGENCY MEDICINE

## 2024-03-22 PROCEDURE — 80047 BASIC METABLC PNL IONIZED CA: CPT

## 2024-03-22 PROCEDURE — 99215 OFFICE O/P EST HI 40 MIN: CPT

## 2024-03-22 PROCEDURE — 71046 X-RAY EXAM CHEST 2 VIEWS: CPT | Performed by: EMERGENCY MEDICINE

## 2024-03-22 PROCEDURE — 84484 ASSAY OF TROPONIN QUANT: CPT

## 2024-03-22 RX ORDER — BENZONATATE 200 MG/1
200 CAPSULE ORAL 3 TIMES DAILY PRN
Qty: 15 CAPSULE | Refills: 0 | Status: SHIPPED | OUTPATIENT
Start: 2024-03-22

## 2024-03-22 NOTE — ED INITIAL ASSESSMENT (HPI)
Patient arrived ambulatory to room c/o symptoms that started 11 days ago. +cough. Nasal congestion initially, now resolved. No fevers. No n/v/d. Easy non labored respirations.

## 2024-03-22 NOTE — ED PROVIDER NOTES
Patient Seen in: [facility]      History     Chief Complaint   Patient presents with    Cough     Stated Complaint: respitory complaint    Subjective:     48-year-old female presents today for evaluation of cough.  Patient reports she had a cough for the past 11 days.  She reports now she has chest pain with coughing.  She feels short of breath when she lies flat.  No pain or swelling in her legs.  Had a mild sore throat initially that is since resolved.  Patient was seen at immediate care and had strep checked that was negative.  No fevers.  No vomiting or diarrhea.  Symptoms are acute moderate.    Objective:   Past Medical History:   Diagnosis Date    SHAUN (obstructive sleep apnea) 10/22/2021 HST    AHI 15.1 Supine AHI 15 non-supine AHI 47.1              History reviewed. No pertinent surgical history.             Social History     Socioeconomic History    Marital status:    Tobacco Use    Smoking status: Never    Smokeless tobacco: Never    Tobacco comments:     socially in college 20 years ago   Vaping Use    Vaping Use: Never used   Substance and Sexual Activity    Alcohol use: Yes     Comment: occ    Drug use: Never                Physical Exam     ED Triage Vitals [03/22/24 1204]   /87   Pulse 65   Resp 18   Temp 97.8 °F (36.6 °C)   Temp src    SpO2 98 %   O2 Device None (Room air)       Current:/87   Pulse 65   Temp 97.8 °F (36.6 °C)   Resp 18   LMP 02/15/2024 (Exact Date)   SpO2 98%         Physical Exam  Vitals reviewed.   Constitutional:       General: She is not in acute distress.     Appearance: Normal appearance. She is not ill-appearing or toxic-appearing.   HENT:      Head: Normocephalic and atraumatic.      Mouth/Throat:      Mouth: Mucous membranes are moist.      Pharynx: Oropharynx is clear. No pharyngeal swelling, oropharyngeal exudate or posterior oropharyngeal erythema.   Eyes:      Extraocular Movements: Extraocular movements intact.       Conjunctiva/sclera: Conjunctivae normal.   Cardiovascular:      Rate and Rhythm: Normal rate and regular rhythm.   Pulmonary:      Effort: Pulmonary effort is normal.      Breath sounds: Normal breath sounds.   Musculoskeletal:         General: No swelling or tenderness.      Cervical back: Neck supple.      Right lower leg: No edema.      Left lower leg: No edema.   Skin:     General: Skin is warm and dry.   Neurological:      Mental Status: She is alert and oriented to person, place, and time.   Psychiatric:         Mood and Affect: Mood normal.         ED Course     Labs Reviewed   D-DIMER (POC) - Abnormal; Notable for the following components:       Result Value    D-Dimer  (*)     All other components within normal limits   POCT CBC - Abnormal; Notable for the following components:    MCV IC 77.3 (*)     MCH IC 24.7 (*)     PLT .0 (*)     All other components within normal limits   POCT ISTAT CHEM8 CARTRIDGE - Normal   ISTAT TROPONIN - Normal     Imaging:  CT CHEST PE AORTA (IV ONLY) (CPT=71260)    Result Date: 3/22/2024  CONCLUSION:  1. No pulmonary embolus. 2. Mild bronchitis and mild air trapping in lower lungs. 3. Two approximately 2 mm left lower lobe pulmonary nodules and a 2 mm right lower lobe pulmonary nodule are likely postinflammatory nodules possibly small impacted distal airways.    Dictated by (CST): Manpreet Howell MD on 3/22/2024 at 2:15 PM     Finalized by (CST): Manpreet Howell MD on 3/22/2024 at 2:26 PM          XR CHEST PA + LAT CHEST (CPT=71046)    Result Date: 3/22/2024  PROCEDURE: XR CHEST PA + LAT CHEST (CPT=71046)  COMPARISON: None.  INDICATIONS: Pt here with difficulty breathing, nasal congestion and cough x 9 days. Hx: HTN  TECHNIQUE:   Two views.   Findings and impression:  Normal heart size.  No edema.  Normal lung volumes.  Linear atelectasis or scar at the lung bases.  No consolidation.  Normal pleura     Dictated by (CST): Christos Be MD on 3/22/2024 at 12:34 PM      Finalized by (CST): Christos Be MD on 3/22/2024 at 12:35 PM         EKG    Rate, intervals and axes as noted on EKG Report.  Rate: 70  Rhythm: Sinus Rhythm  Reading: No STEMI, nonspecific ST-T wave changes           ED Course as of 03/22/24 1436  ------------------------------------------------------------  Time: 03/22 1254  Comment: Lab work reveals normal electrolytes, platelet 459, D-dimer positive 455.  Troponin is negative.  ------------------------------------------------------------  Time: 03/22 1254  Comment: Chest x-ray is unremarkable.  Will check CT chest.  ------------------------------------------------------------  Time: 03/22 1434  Comment: CT scan reviewed.  Discussed with patient.  No pneumonia.  Nodules recognized.  Patient is aware and will follow-up.  Likely viral syndrome.            MDM        48 year old female with viral symptoms and chest pain and shortness of breath.  Will check labs and imaging.    Differential diagnosis (including but not limited to):  Upper respiratory infection, STEMI, non-STEMI, unstable angina, pulmonary embolism, heart failure    ED course:  Pulse Ox: 98% on room air which is normal      Comment: Please note this report has been produced using speech recognition software and may contain errors related to that system including errors in grammar, punctuation, and spelling, as well as words and phrases that may be inappropriate. If there are any questions or concerns please feel free to contact the dictating provider for clarification.                                     Medical Decision Making      Disposition and Plan     Clinical Impression:  1. Upper respiratory tract infection, unspecified type         Disposition:  Discharge  3/22/2024  2:36 pm    Follow-up:  Neri Escobar DO  [address] [address]  [phone]    Schedule an appointment as soon as possible for a visit             Medications Prescribed:  Current Discharge  Medication List        START taking these medications    Details   benzonatate 200 MG Oral Cap Take 1 capsule (200 mg total) by mouth 3 (three) times daily as needed for cough.  Qty: 15 capsule, Refills: 0                                    Bryan Devine MD  3/22/2024  12:14 PM

## 2024-03-22 NOTE — DISCHARGE INSTRUCTIONS
Thank you for visiting our immediate care for your health care needs.  Please follow up with your regular doctor in the next 1-2 days.  If you have any additional problems please return to the immediate care.  Please take Tylenol and or Motrin for pain and fevers.  Please take all medications as prescribed.

## 2024-04-05 ENCOUNTER — OFFICE VISIT (OUTPATIENT)
Dept: INTERNAL MEDICINE CLINIC | Facility: CLINIC | Age: 49
End: 2024-04-05
Payer: COMMERCIAL

## 2024-04-05 VITALS
TEMPERATURE: 98 F | SYSTOLIC BLOOD PRESSURE: 132 MMHG | HEIGHT: 66 IN | DIASTOLIC BLOOD PRESSURE: 76 MMHG | OXYGEN SATURATION: 100 % | RESPIRATION RATE: 18 BRPM | HEART RATE: 78 BPM | WEIGHT: 285 LBS | BODY MASS INDEX: 45.8 KG/M2

## 2024-04-05 DIAGNOSIS — R05.1 ACUTE COUGH: ICD-10-CM

## 2024-04-05 DIAGNOSIS — L30.9 ECZEMA, UNSPECIFIED TYPE: Primary | ICD-10-CM

## 2024-04-05 PROCEDURE — 99214 OFFICE O/P EST MOD 30 MIN: CPT | Performed by: INTERNAL MEDICINE

## 2024-04-05 RX ORDER — PREDNISONE 20 MG/1
TABLET ORAL
Qty: 9 TABLET | Refills: 0 | Status: SHIPPED | OUTPATIENT
Start: 2024-04-05 | End: 2024-04-12

## 2024-04-05 RX ORDER — ALBUTEROL SULFATE 90 UG/1
1 AEROSOL, METERED RESPIRATORY (INHALATION) EVERY 6 HOURS PRN
Qty: 1 EACH | Refills: 0 | Status: SHIPPED | OUTPATIENT
Start: 2024-04-05

## 2024-04-05 NOTE — PROGRESS NOTES
Leeanna Sofia  11/18/1975    Chief Complaint   Patient presents with    Rash     TA RM13       SUBJECTIVE   Leeanna Sofia is a 48 year old female who presents for eval of rash.    She has a history of eczema. She was seen by CS on 3/1 and had same rash on right arm, leg and trunk. Now on left forearm. Has been applying Triamcinolone without much relief. Skin is raw and rough, both pruritic and painful.    Seen in UC on 3/14 and 3/22 for URI. D dimer was elevated so CT PE was done. Negative for PE but showed bronchitis and air trapping. On 3/14 rapid strep and culture negative. Her son had strep throat.    Review of Systems   Review of Systems   No f/c/chest pain or sob. No abd pain/n/v/d. No ha or dizziness. No numbness, tingling, or weakness.     OBJECTIVE:   /76   Pulse 78   Temp 97.5 °F (36.4 °C)   Resp 18   Ht 5' 6\" (1.676 m)   Wt 285 lb (129.3 kg)   LMP 02/15/2024 (Exact Date)   SpO2 100%   BMI 46.00 kg/m²   Physical Exam   Constitutional: Oriented to person, place, and time. No distress.   Pulmonary/Chest: Effort normal. No respiratory distress.  Musculoskeletal: No edema  Skin: Skin is warm and dry. Erythematous skin slightly raised an in patches with overlying excoriations located in right antecubital fossa and posterior forearm/elbow, right flank and right popliteal fossa.    Lab Results   Component Value Date    GLU 95 03/08/2024    BUN 9 03/08/2024    CREATSERUM 0.70 03/08/2024    BUNCREA 16.9 05/21/2022    ANIONGAP 7 03/08/2024    GFRAA 118 05/21/2022    GFRNAA 102 05/21/2022    CA 9.4 03/08/2024     03/08/2024    K 4.4 03/08/2024     03/08/2024    CO2 25.0 03/08/2024    OSMOCALC 280 03/08/2024      Lab Results   Component Value Date    WBC 9.4 03/08/2024    RBC 5.26 03/08/2024    HGB 13.1 03/08/2024    HCT 40.6 03/08/2024    MCV 77.3 (L) 03/22/2024    MCH 24.9 (L) 03/08/2024    MCHC 31.9 03/22/2024    RDW 15.8 03/08/2024    .0 03/08/2024      Lab  Results   Component Value Date    TSH 2.150 03/08/2024        ASSESSMENT AND PLAN:       ICD-10-CM    1. Eczema, unspecified type  L30.9 predniSONE 20 MG Oral Tab   Instructed to apply a gentle moisturizer in addition to steroid. Her DM is very well controlled so okay for steroid. She was warned about hyperglycemia.   2. Acute cough  R05.1 albuterol 108 (90 Base) MCG/ACT Inhalation Aero Soln   Steroid may help as well. Likely a  viral URI.         The patient indicates understanding of these issues and agrees to the plan.  The patient is asked to return or present to the emergency room for worsening of symptoms.    TODAY'S ORDERS     No orders of the defined types were placed in this encounter.      Meds & Refills:  Requested Prescriptions      No prescriptions requested or ordered in this encounter       Imaging & Consults:  None    No follow-ups on file.  There are no Patient Instructions on file for this visit.    All questions were answered and the patient agrees with the plan.     Thank you,  Neri Escobar, DO

## 2024-04-13 ENCOUNTER — HOSPITAL ENCOUNTER (OUTPATIENT)
Dept: MAMMOGRAPHY | Age: 49
Discharge: HOME | End: 2024-04-13
Attending: PHYSICIAN ASSISTANT
Payer: COMMERCIAL

## 2024-04-13 DIAGNOSIS — Z12.31 ENCOUNTER FOR SCREENING MAMMOGRAM FOR MALIGNANT NEOPLASM OF BREAST: ICD-10-CM

## 2024-04-13 PROCEDURE — 77067 SCR MAMMO BI INCL CAD: CPT | Performed by: PHYSICIAN ASSISTANT

## 2024-04-13 PROCEDURE — 77063 BREAST TOMOSYNTHESIS BI: CPT | Performed by: PHYSICIAN ASSISTANT

## 2024-04-27 DIAGNOSIS — R05.1 ACUTE COUGH: ICD-10-CM

## 2024-04-29 NOTE — TELEPHONE ENCOUNTER
Requested Prescriptions     Pending Prescriptions Disp Refills    ALBUTEROL 108 (90 Base) MCG/ACT Inhalation Aero Soln [Pharmacy Med Name: ALBUTEROL HFA (PROAIR) INHALER] 8.5 each 0     Sig: INHALE 1 PUFF INTO THE LUNGS EVERY 6 HOURS AS NEEDED.       LOV: 4-5-2024-mp-problem visit    LAST CPE:overdue    Last Labs: 3-8-2024-cbc.cmp.lipid    Last Refill:  4-5-2024-1 each with 0 refills    Your appointments       Date & Time Appointment Department ([facility] [place])    Sep 07, 2024 10:00 AM CDT Adult Physical with Evangelina Lagos PA-C [facility], [address] [place] [address] ([facility] [address] [place] [address]/[facility])    PLEASE NOTE - Most insurances allow a Complete Physical once every 366 days. Please schedule accordingly.    Please arrive 15 minutes prior to your scheduled appointment. Please also bring your Insurance card, Photo ID, and your medication bottles or a list of your current medication.    If you no longer require this appointment, please contact your physician office to cancel.              [facility], [address] [place] [address]  [facility] [address] [place] [address]/[facility]  [address] [place] [id number] [phone]

## 2024-05-01 RX ORDER — ALBUTEROL SULFATE 90 UG/1
1 AEROSOL, METERED RESPIRATORY (INHALATION) EVERY 6 HOURS PRN
Qty: 8.5 EACH | Refills: 0 | Status: SHIPPED | OUTPATIENT
Start: 2024-05-01

## 2024-06-11 ENCOUNTER — TELEPHONE (OUTPATIENT)
Dept: INTERNAL MEDICINE CLINIC | Facility: CLINIC | Age: 49
End: 2024-06-11

## 2024-06-11 NOTE — TELEPHONE ENCOUNTER
Condition update received from [facility]  on 6/6/24, placed on MP  desk for review.

## 2024-08-06 ENCOUNTER — LAB REQUISITION (OUTPATIENT)
Dept: LAB | Facility: HOSPITAL | Age: 49
End: 2024-08-06
Payer: COMMERCIAL

## 2024-08-06 DIAGNOSIS — D48.5 NEOPLASM OF UNCERTAIN BEHAVIOR OF SKIN: ICD-10-CM

## 2024-08-06 PROCEDURE — 88305 TISSUE EXAM BY PATHOLOGIST: CPT | Performed by: PHYSICIAN ASSISTANT

## 2024-08-11 RX ORDER — LEVOTHYROXINE SODIUM 88 UG/1
88 TABLET ORAL DAILY
Qty: 90 TABLET | Refills: 3 | Status: CANCELLED | OUTPATIENT
Start: 2024-08-11

## 2024-08-14 NOTE — TELEPHONE ENCOUNTER
Outpatient Medication Detail     Disp Refills Start End    levothyroxine 88 MCG Oral Tab 90 tablet 3 3/1/2024 --    Sig - Route: Take 1 tablet (88 mcg total) by mouth daily. - Oral    Sent to pharmacy as: Levothyroxine Sodium 88 MCG Oral Tablet (Synthroid)    E-Prescribing Status: Receipt confirmed by pharmacy (3/1/2024  1:38 PM CST)      Pharmacy    [facility] #[id number] - [address] AT [facility], [phone], [phone]

## 2024-08-31 ENCOUNTER — LAB ENCOUNTER (OUTPATIENT)
Dept: LAB | Facility: REFERENCE LAB | Age: 49
End: 2024-08-31
Attending: PHYSICIAN ASSISTANT
Payer: COMMERCIAL

## 2024-08-31 DIAGNOSIS — E78.5 HYPERLIPIDEMIA, UNSPECIFIED HYPERLIPIDEMIA TYPE: ICD-10-CM

## 2024-08-31 DIAGNOSIS — E11.9 CONTROLLED TYPE 2 DIABETES MELLITUS WITHOUT COMPLICATION, WITHOUT LONG-TERM CURRENT USE OF INSULIN (HCC): ICD-10-CM

## 2024-08-31 DIAGNOSIS — R71.8 MICROCYTOSIS: ICD-10-CM

## 2024-08-31 DIAGNOSIS — Z13.0 SCREENING, ANEMIA, DEFICIENCY, IRON: ICD-10-CM

## 2024-08-31 DIAGNOSIS — I10 PRIMARY HYPERTENSION: ICD-10-CM

## 2024-08-31 DIAGNOSIS — E03.9 HYPOTHYROIDISM, UNSPECIFIED TYPE: ICD-10-CM

## 2024-08-31 LAB
ALBUMIN SERPL-MCNC: 4 G/DL (ref 3.2–4.8)
ALBUMIN/GLOB SERPL: 1.3 {RATIO} (ref 1–2)
ALP LIVER SERPL-CCNC: 84 U/L
ALT SERPL-CCNC: 12 U/L
ANION GAP SERPL CALC-SCNC: 7 MMOL/L (ref 0–18)
AST SERPL-CCNC: 14 U/L (ref ?–34)
BILIRUB SERPL-MCNC: 0.3 MG/DL (ref 0.3–1.2)
BUN BLD-MCNC: 11 MG/DL (ref 9–23)
BUN/CREAT SERPL: 16.9 (ref 10–20)
CALCIUM BLD-MCNC: 9.1 MG/DL (ref 8.7–10.4)
CHLORIDE SERPL-SCNC: 109 MMOL/L (ref 98–112)
CHOLEST SERPL-MCNC: 166 MG/DL (ref ?–200)
CO2 SERPL-SCNC: 28 MMOL/L (ref 21–32)
CREAT BLD-MCNC: 0.65 MG/DL
DEPRECATED HBV CORE AB SER IA-ACNC: 11.3 NG/ML
EGFRCR SERPLBLD CKD-EPI 2021: 109 ML/MIN/1.73M2 (ref 60–?)
FASTING PATIENT LIPID ANSWER: YES
FASTING STATUS PATIENT QL REPORTED: YES
GLOBULIN PLAS-MCNC: 3.1 G/DL (ref 2–3.5)
GLUCOSE BLD-MCNC: 90 MG/DL (ref 70–99)
HDLC SERPL-MCNC: 64 MG/DL (ref 40–59)
IRON SATN MFR SERPL: 9 %
IRON SERPL-MCNC: 34 UG/DL
LDLC SERPL CALC-MCNC: 83 MG/DL (ref ?–100)
NONHDLC SERPL-MCNC: 102 MG/DL (ref ?–130)
OSMOLALITY SERPL CALC.SUM OF ELEC: 297 MOSM/KG (ref 275–295)
POTASSIUM SERPL-SCNC: 4.3 MMOL/L (ref 3.5–5.1)
PROT SERPL-MCNC: 7.1 G/DL (ref 5.7–8.2)
SODIUM SERPL-SCNC: 144 MMOL/L (ref 136–145)
TIBC SERPL-MCNC: 368 UG/DL (ref 250–425)
TRANSFERRIN SERPL-MCNC: 247 MG/DL (ref 250–380)
TRIGL SERPL-MCNC: 104 MG/DL (ref 30–149)
VLDLC SERPL CALC-MCNC: 16 MG/DL (ref 0–30)

## 2024-08-31 PROCEDURE — 80053 COMPREHEN METABOLIC PANEL: CPT

## 2024-08-31 PROCEDURE — 36415 COLL VENOUS BLD VENIPUNCTURE: CPT

## 2024-08-31 PROCEDURE — 80061 LIPID PANEL: CPT

## 2024-08-31 PROCEDURE — 84466 ASSAY OF TRANSFERRIN: CPT

## 2024-08-31 PROCEDURE — 82728 ASSAY OF FERRITIN: CPT

## 2024-08-31 PROCEDURE — 83540 ASSAY OF IRON: CPT

## 2024-09-07 ENCOUNTER — OFFICE VISIT (OUTPATIENT)
Dept: INTERNAL MEDICINE CLINIC | Facility: CLINIC | Age: 49
End: 2024-09-07
Payer: COMMERCIAL

## 2024-09-07 ENCOUNTER — TELEPHONE (OUTPATIENT)
Dept: INTERNAL MEDICINE CLINIC | Facility: CLINIC | Age: 49
End: 2024-09-07

## 2024-09-07 VITALS
SYSTOLIC BLOOD PRESSURE: 130 MMHG | BODY MASS INDEX: 46 KG/M2 | WEIGHT: 282 LBS | DIASTOLIC BLOOD PRESSURE: 76 MMHG | TEMPERATURE: 97 F | OXYGEN SATURATION: 98 % | HEART RATE: 80 BPM

## 2024-09-07 DIAGNOSIS — Z00.00 ROUTINE GENERAL MEDICAL EXAMINATION AT A HEALTH CARE FACILITY: Primary | ICD-10-CM

## 2024-09-07 DIAGNOSIS — E03.9 HYPOTHYROIDISM, UNSPECIFIED TYPE: ICD-10-CM

## 2024-09-07 DIAGNOSIS — I10 PRIMARY HYPERTENSION: ICD-10-CM

## 2024-09-07 DIAGNOSIS — E11.9 CONTROLLED TYPE 2 DIABETES MELLITUS WITHOUT COMPLICATION, WITHOUT LONG-TERM CURRENT USE OF INSULIN (HCC): ICD-10-CM

## 2024-09-07 DIAGNOSIS — E66.01 MORBID OBESITY DUE TO EXCESS CALORIES (HCC): ICD-10-CM

## 2024-09-07 DIAGNOSIS — Z12.4 SCREENING FOR CERVICAL CANCER: ICD-10-CM

## 2024-09-07 DIAGNOSIS — F32.5 MAJOR DEPRESSIVE DISORDER IN FULL REMISSION, UNSPECIFIED WHETHER RECURRENT (HCC): ICD-10-CM

## 2024-09-07 DIAGNOSIS — E61.1 IRON DEFICIENCY: ICD-10-CM

## 2024-09-07 DIAGNOSIS — L30.9 ECZEMA, UNSPECIFIED TYPE: ICD-10-CM

## 2024-09-07 LAB — HEMOGLOBIN A1C: 5.7 % (ref 4.3–5.6)

## 2024-09-07 PROCEDURE — 87624 HPV HI-RISK TYP POOLED RSLT: CPT | Performed by: PHYSICIAN ASSISTANT

## 2024-09-07 PROCEDURE — 99396 PREV VISIT EST AGE 40-64: CPT | Performed by: PHYSICIAN ASSISTANT

## 2024-09-07 PROCEDURE — 88175 CYTOPATH C/V AUTO FLUID REDO: CPT | Performed by: PHYSICIAN ASSISTANT

## 2024-09-07 PROCEDURE — 83036 HEMOGLOBIN GLYCOSYLATED A1C: CPT | Performed by: PHYSICIAN ASSISTANT

## 2024-09-07 RX ORDER — TIRZEPATIDE 12.5 MG/.5ML
12.5 INJECTION, SOLUTION SUBCUTANEOUS WEEKLY
Qty: 6 ML | Refills: 1 | Status: SHIPPED | OUTPATIENT
Start: 2024-09-07

## 2024-09-07 RX ORDER — BUPROPION HYDROCHLORIDE 150 MG/1
150 TABLET ORAL DAILY
Qty: 90 TABLET | Refills: 1 | Status: SHIPPED | OUTPATIENT
Start: 2024-09-07

## 2024-09-07 NOTE — TELEPHONE ENCOUNTER
Faxed medical records request to [facility] for last diabetic eye exam. Confirmation received and sent to scans.

## 2024-09-07 NOTE — PROGRESS NOTES
Leeanna Sofia is a 48 year old female.    Chief Complaint   Patient presents with    Physical       Patient complains of:    DM.   A1c today is 5.7%.   Currently taking mounjaro 12.5 mg weekly and metformin  mg 2 tabs daily.   Eye exam - completed at [facility] in [facility] approx 4/2024.     Weight continues to be a concern. She has been working with the weight loss clinic at [facility] and she is currently taking diethylpropion, metformin, and mounjaro. She has lost approx 50 lbs but has since hit a plateau. She would like to see weight loss clinic here and would also like to consider a consultation with bariatric surgery.     Low iron. Periods have been more irregular. H/o low iron in the past as well.     Eczema has been once of her biggest concerns. Seeing derm for this. No relief with topical steroids. The only thing that helps is oral steroids. Trying to get dupixent covered by insurance. Symptoms are worse when she sweats so she avoids exercise which she recognizes is not good for her weight. This is causing more anxiety and triggering her depression as well.   She is currently taking zoloft 100 mg daily.     Current Outpatient Medications on File Prior to Visit   Medication Sig Dispense Refill    albuterol 108 (90 Base) MCG/ACT Inhalation Aero Soln Inhale 1 puff into the lungs every 6 (six) hours as needed. 8.5 each 0    atorvastatin (LIPITOR) 10 MG Oral Tab Take 1 tablet (10 mg total) by mouth nightly. 90 tablet 1    Lidocaine Viscous HCl 2 % Mouth/Throat Solution Take 5 mL by mouth every 3 (three) hours as needed for Pain. Spit or swallow 100 mL 0    triamcinolone 0.1 % External Cream Apply topically 2 (two) times daily as needed. 60 g 1    levothyroxine 88 MCG Oral Tab Take 1 tablet (88 mcg total) by mouth daily. 90 tablet 3    lisinopril 20 MG Oral Tab Take 1 tablet (20 mg total) by mouth daily. 90 tablet 3    metFORMIN HCl  MG Oral Tablet 24 Hr Take 2 tablets (1,500 mg  total) by mouth daily with breakfast. 60 tablet 2    Diethylpropion HCl ER 75 MG Oral Tablet 24 Hr Take 1 tablet (75 mg total) by mouth daily. 30 tablet 0    Blood Glucose Monitoring Suppl (ACCU-CHEK GUIDE) w/Device Does not apply Kit 1 lancet by Finger stick route 2 (two) times daily.      Blood Glucose Monitoring Suppl (PRECISION XTRA) Does not apply Device 1 Application 2 (two) times daily.      Glucose Blood (ACCU-CHEK GUIDE) In Vitro Strip 1 STRIP BY MIS. (NON DRUG COMBO ROUTE) ROUTE DAILY.      Sertraline HCl 100 MG Oral Tab TAKE 1 TABLET BY MOUTH EVERY DAY       No current facility-administered medications on file prior to visit.        Past Medical History:    SHAUN (obstructive sleep apnea)    AHI 15.1 Supine AHI 15 non-supine AHI 47.1     No past surgical history on file.  No family history on file.    Social History     Socioeconomic History    Marital status:    Tobacco Use    Smoking status: Never    Smokeless tobacco: Never    Tobacco comments:     socially in college 20 years ago   Vaping Use    Vaping status: Never Used   Substance and Sexual Activity    Alcohol use: Yes     Comment: occ    Drug use: Never         HEALTH MAINTENANCE:     Health Maintenance Due   Topic Date Due    Diabetes Care Dilated Eye Exam  Never done    Annual Physical  Never done    Pap Smear  Never done    COVID-19 Vaccine (4 - 2023-24 season) 09/01/2024    Diabetes Care A1C  09/08/2024 03/13/2023  -  04/13/2024      REVIEW OF SYSTEMS:   GENERAL: feels well otherwise  SKIN: denies any unusual skin lesions, +eczema  EYES:denies blurred vision or double vision  HEENT: denies nasal congestion, sinus pain or ST  LUNGS: denies shortness of breath with exertion  CARDIOVASCULAR: denies chest pain on exertion  GI: denies abdominal pain,denies heartburn  : denies dysuria, vaginal discharge or itching  MUSCULOSKELETAL: denies back pain  NEURO: denies headaches  PSYCHE: + depression or anxiety  HEMATOLOGIC: + hx of  anemia  ENDOCRINE: + thyroid history  ALL/ASTHMA: denies hx of allergy or asthma    EXAM:   /76 (BP Location: Right arm, Patient Position: Sitting, Cuff Size: large)   Pulse 80   Temp 97.1 °F (36.2 °C) (Temporal)   Wt 282 lb (127.9 kg)   LMP 08/20/2024 (Exact Date)   SpO2 98%   BMI 45.52 kg/m²   GENERAL: well developed, well nourished,in no apparent distress  SKIN: diffuse rash   HEENT: atraumatic, normocephalic,ears and throat are clear  EYES:PERRLA, EOMI, conjunctiva are clear  NECK: supple,no adenopathy  CHEST: no chest tenderness  BREAST: no dominant or suspicious mass  LUNGS: clear to auscultation  CARDIO: RRR without murmur  GI: good BS's,no masses, HSM or tenderness  GYNE/:   EXTERNAL GENITALIA: normal, no lesions   VAGINA: normal, scant discharge   CERVIX: normal, no lesions, pap done with HR HPV    BIMANUAL: no cervical motion tenderness, no adnexal masses or tenderness   MUSCULOSKELETAL: back is not tender,FROM of the back  EXTREMITIES: no cyanosis, clubbing or edema  NEURO: Oriented times three,cranial nerves are intact,motor and sensory are grossly intact    ASSESSMENT AND PLAN:   1. Routine general medical examination at a health care facility  Labs reviewed with pt   Mammogram utd  Pap today     2. Major depressive disorder in full remission, unspecified whether recurrent (HCC)  Continue zoloft 100 mg daily   Add wellbutrin  mg daily - no h/o seizures  F/u in 3 months     3. Controlled type 2 diabetes mellitus without complication, without long-term current use of insulin (HCC)  A1c shows good control   Continue metformin and mounjaro   Recheck labs in 6 months   MARISEL completed today to obtain eye exam records   - POC Hemoglobin A1C  - Tirzepatide (MOUNJARO) 12.5 MG/0.5ML Subcutaneous Solution Pen-injector; Inject 12.5 mg into the skin once a week.  Dispense: 6 mL; Refill: 1    4. Hypothyroidism, unspecified type  Stable on current dose   CPM     5. Primary hypertension  Well  controlled  CPM    6. Morbid obesity due to excess calories (HCC)  [facility]     7. Screening for cervical cancer  - ThinPrep PAP Smear; Future  - Hpv Dna  High Risk , Thin Prep Collect; Future    8. Iron deficiency  Start ferrous sulfate 325 mg daily   Recheck labs in 2-3 months   - Iron And Tibc [E]; Future  - Ferritin [E]; Future  - CBC W Differential W Platelet [E]; Future    9. Eczema, unspecified type  Per derm       Pt' s weight is   Wt Readings from Last 6 Encounters:   09/07/24 282 lb (127.9 kg)   04/05/24 285 lb (129.3 kg)   03/01/24 281 lb (127.5 kg)   12/23/23 281 lb 9.6 oz (127.7 kg)   11/20/23 280 lb (127 kg)   05/25/23 264 lb 6.4 oz (119.9 kg)   , Body mass index is 45.52 kg/m². recommended low fat diet and aerobic exercise 30 minutes three times weekly.     Encounter Diagnoses   Name Primary?    Routine general medical examination at a health care facility Yes    Major depressive disorder in full remission, unspecified whether recurrent (HCC)     Controlled type 2 diabetes mellitus without complication, without long-term current use of insulin (HCC)     Hypothyroidism, unspecified type     Primary hypertension     Morbid obesity due to excess calories (HCC)     Screening for cervical cancer     Iron deficiency     Eczema, unspecified type        Orders Placed This Encounter   Procedures    POC Hemoglobin A1C    Hpv Dna  High Risk , Thin Prep Collect    Iron And Tibc [E]    Ferritin [E]    CBC W Differential W Platelet [E]    ThinPrep PAP Smear       Meds and Refills:  Requested Prescriptions     Signed Prescriptions Disp Refills    buPROPion  MG Oral Tablet 24 Hr 90 tablet 1     Sig: Take 1 tablet (150 mg total) by mouth daily.    Tirzepatide (MOUNJARO) 12.5 MG/0.5ML Subcutaneous Solution Pen-injector 6 mL 1     Sig: Inject 12.5 mg into the skin once a week.       Imaging and Referrals:  None    The patient indicates understanding of these  issues and agrees to the plan.  The patient is asked to return for CPX in one year.    Evangelina Lagos PA-C

## 2024-09-09 ENCOUNTER — MED REC SCAN ONLY (OUTPATIENT)
Dept: INTERNAL MEDICINE CLINIC | Facility: CLINIC | Age: 49
End: 2024-09-09

## 2024-09-09 LAB — HPV E6+E7 MRNA CVX QL NAA+PROBE: NEGATIVE

## 2024-09-10 LAB
.: NORMAL
.: NORMAL

## 2024-10-02 DIAGNOSIS — E78.5 HYPERLIPIDEMIA, UNSPECIFIED HYPERLIPIDEMIA TYPE: ICD-10-CM

## 2024-10-04 RX ORDER — ATORVASTATIN CALCIUM 10 MG/1
10 TABLET, FILM COATED ORAL NIGHTLY
Qty: 90 TABLET | Refills: 1 | Status: SHIPPED | OUTPATIENT
Start: 2024-10-04

## 2024-12-07 ENCOUNTER — OFFICE VISIT (OUTPATIENT)
Dept: INTERNAL MEDICINE CLINIC | Facility: CLINIC | Age: 49
End: 2024-12-07
Payer: COMMERCIAL

## 2024-12-07 VITALS
DIASTOLIC BLOOD PRESSURE: 80 MMHG | HEART RATE: 97 BPM | BODY MASS INDEX: 43.23 KG/M2 | OXYGEN SATURATION: 97 % | WEIGHT: 269 LBS | HEIGHT: 66 IN | TEMPERATURE: 97 F | RESPIRATION RATE: 16 BRPM | SYSTOLIC BLOOD PRESSURE: 128 MMHG

## 2024-12-07 DIAGNOSIS — F41.9 ANXIETY AND DEPRESSION: ICD-10-CM

## 2024-12-07 DIAGNOSIS — L30.9 ECZEMA, UNSPECIFIED TYPE: Primary | ICD-10-CM

## 2024-12-07 DIAGNOSIS — E66.01 MORBID OBESITY DUE TO EXCESS CALORIES (HCC): ICD-10-CM

## 2024-12-07 DIAGNOSIS — F32.A ANXIETY AND DEPRESSION: ICD-10-CM

## 2024-12-07 PROCEDURE — 99214 OFFICE O/P EST MOD 30 MIN: CPT | Performed by: PHYSICIAN ASSISTANT

## 2024-12-07 RX ORDER — BUPROPION HYDROCHLORIDE 300 MG/1
300 TABLET ORAL DAILY
Qty: 90 TABLET | Refills: 1 | Status: SHIPPED | OUTPATIENT
Start: 2024-12-07

## 2024-12-07 RX ORDER — TIRZEPATIDE 12.5 MG/.5ML
INJECTION, SOLUTION SUBCUTANEOUS
COMMUNITY
Start: 2024-12-02

## 2024-12-07 RX ORDER — DIETHYLPROPION HYDROCHLORIDE 25 MG/1
50 TABLET ORAL DAILY
COMMUNITY
Start: 2024-06-24

## 2024-12-07 NOTE — PATIENT INSTRUCTIONS
Allergists:    Dr Berumen - [email or web address]  [facility] - [email or web address]

## 2024-12-07 NOTE — PROGRESS NOTES
Leeanna Sofia is a 49 year old female.   Chief Complaint   Patient presents with    Follow - Up     Rm 6 SS     HPI:    Pt presents for f/u.   Tolerating wellbutrin well without side effects. Weight is down from 282 lbs to 269 lbs. Anxiety and depression are better as well.   She is still struggling with eczema. Dupixent did not help. Now considering allergy testing.     Allergies:  Allergies[1]   Current Meds:  Current Outpatient Medications   Medication Sig Dispense Refill    MOUNJARO 12.5 MG/0.5ML Subcutaneous Solution Auto-injector       Diethylpropion HCl 25 MG Oral Tab Take 50 mg by mouth daily.      buPROPion  MG Oral Tablet 24 Hr Take 1 tablet (300 mg total) by mouth daily. 90 tablet 1    ATORVASTATIN 10 MG Oral Tab TAKE 1 TABLET BY MOUTH EVERY DAY AT NIGHT 90 tablet 1    Tirzepatide (MOUNJARO) 12.5 MG/0.5ML Subcutaneous Solution Pen-injector Inject 12.5 mg into the skin once a week. 6 mL 1    albuterol 108 (90 Base) MCG/ACT Inhalation Aero Soln Inhale 1 puff into the lungs every 6 (six) hours as needed. 8.5 each 0    triamcinolone 0.1 % External Cream Apply topically 2 (two) times daily as needed. 60 g 1    levothyroxine 88 MCG Oral Tab Take 1 tablet (88 mcg total) by mouth daily. 90 tablet 3    lisinopril 20 MG Oral Tab Take 1 tablet (20 mg total) by mouth daily. 90 tablet 3    metFORMIN HCl  MG Oral Tablet 24 Hr Take 2 tablets (1,500 mg total) by mouth daily with breakfast. 60 tablet 2    Blood Glucose Monitoring Suppl (ACCU-CHEK GUIDE) w/Device Does not apply Kit 1 lancet by Finger stick route 2 (two) times daily.      Blood Glucose Monitoring Suppl (PRECISION XTRA) Does not apply Device 1 Application 2 (two) times daily.      Glucose Blood (ACCU-CHEK GUIDE) In [place] Strip 1 [place] [facility] [place] DRUG COMBO ROUTE) ROUTE DAILY.      Sertraline HCl 100 MG Oral Tab TAKE 1 TABLET BY MOUTH EVERY DAY      Lidocaine Viscous HCl 2 % Mouth/Throat Solution Take 5 mL by mouth every 3 (three)  hours as needed for Pain. Spit or swallow (Patient not taking: Reported on 12/7/2024) 100 mL 0    Diethylpropion HCl ER 75 MG Oral Tablet 24 Hr Take 1 tablet (75 mg total) by mouth daily. (Patient not taking: Reported on 12/7/2024) 30 tablet 0        PMH:     Past Medical History:    SHAUN (obstructive sleep apnea)    AHI 15.1 Supine AHI 15 non-supine AHI 47.1       ROS:   GENERAL: Negative for fever, chills and fatigue. NAD.  HENT: Negative for congestion, sore throat, and ear pain.  RESPIRATORY: Negative for cough, chest tightness, shortness of breath and wheezing.    CV: Negative for chest pain, palpitations and leg swelling.   GI: Negative for nausea, vomiting, abdominal pain, diarrhea, and blood in stool.   : Negative for dysuria, hematuria and difficulty urinating.   MUSCULOSKELETAL: Negative for myalgias, back pain, joint swelling, arthralgias and gait problem.   NEURO: Negative for dizziness, syncope, weakness, numbness, tingling and headaches.   PSYCH: +anxiety, +depression       PHYSICAL EXAM:    /80   Pulse 97   Temp 96.8 °F (36 °C) (Temporal)   Resp 16   Ht 5' 6\" (1.676 m)   Wt 269 lb (122 kg)   LMP 11/08/2024 (Exact Date)   SpO2 97%   BMI 43.42 kg/m²     GENERAL: NAD. A&Ox3  RESPIRATORY: CTAB, no R/R/W  CV: RRR, no murmurs.   PSYCH: Appropriate mood and affect.      ASSESSMENT/ PLAN:   1. Eczema, unspecified type  Allergist info given   Also discussed seeing functional med for further eval - referral placed   - Integrative Medicine Physician Consultation ([place]) - Internal Referral    2. Morbid obesity due to excess calories (HCC)  Increase wellbutrin to 300 mg daily   Continue mounjaro and metformin   See weight loss clinic as scheduled   Future Appointments   Date Time Provider Department Center   5/13/2025  9:00 AM Lila Chacon MD [facility]       3. Anxiety and depression  Improved   Increase wellbutrin to 300 mg daily   F/u in 6 months or sooner if needed        There are  no preventive care reminders to display for this patient.        Pt indicates understanding and agrees to the plan.     Return in about 6 months (around 6/7/2025).    Evangelina Lagos PA-C           [1] No Known Allergies

## 2025-03-01 DIAGNOSIS — E11.9 CONTROLLED TYPE 2 DIABETES MELLITUS WITHOUT COMPLICATION, WITHOUT LONG-TERM CURRENT USE OF INSULIN (HCC): ICD-10-CM

## 2025-03-05 RX ORDER — LISINOPRIL 20 MG/1
20 TABLET ORAL DAILY
Qty: 90 TABLET | Refills: 3 | Status: SHIPPED | OUTPATIENT
Start: 2025-03-05

## 2025-03-05 RX ORDER — BUPROPION HYDROCHLORIDE 150 MG/1
150 TABLET ORAL DAILY
Qty: 90 TABLET | Refills: 1 | OUTPATIENT
Start: 2025-03-05

## 2025-03-05 RX ORDER — TIRZEPATIDE 12.5 MG/.5ML
INJECTION, SOLUTION SUBCUTANEOUS
Refills: 0 | OUTPATIENT
Start: 2025-03-05

## 2025-03-06 RX ORDER — TIRZEPATIDE 12.5 MG/.5ML
12.5 INJECTION, SOLUTION SUBCUTANEOUS WEEKLY
Qty: 2 ML | Refills: 1 | Status: SHIPPED | OUTPATIENT
Start: 2025-03-06

## 2025-03-21 ENCOUNTER — OFFICE VISIT (OUTPATIENT)
Dept: INTERNAL MEDICINE CLINIC | Facility: CLINIC | Age: 50
End: 2025-03-21
Payer: COMMERCIAL

## 2025-03-21 ENCOUNTER — LAB ENCOUNTER (OUTPATIENT)
Dept: LAB | Age: 50
End: 2025-03-21
Attending: PHYSICIAN ASSISTANT
Payer: COMMERCIAL

## 2025-03-21 VITALS
OXYGEN SATURATION: 97 % | HEART RATE: 79 BPM | TEMPERATURE: 98 F | RESPIRATION RATE: 18 BRPM | BODY MASS INDEX: 43.65 KG/M2 | HEIGHT: 66 IN | SYSTOLIC BLOOD PRESSURE: 122 MMHG | DIASTOLIC BLOOD PRESSURE: 74 MMHG | WEIGHT: 271.63 LBS

## 2025-03-21 DIAGNOSIS — E03.9 HYPOTHYROIDISM, UNSPECIFIED TYPE: ICD-10-CM

## 2025-03-21 DIAGNOSIS — M77.11 LATERAL EPICONDYLITIS, RIGHT ELBOW: ICD-10-CM

## 2025-03-21 DIAGNOSIS — E61.1 IRON DEFICIENCY: ICD-10-CM

## 2025-03-21 DIAGNOSIS — M25.511 ACUTE PAIN OF RIGHT SHOULDER: ICD-10-CM

## 2025-03-21 DIAGNOSIS — E11.9 CONTROLLED TYPE 2 DIABETES MELLITUS WITHOUT COMPLICATION, WITHOUT LONG-TERM CURRENT USE OF INSULIN (HCC): Primary | ICD-10-CM

## 2025-03-21 DIAGNOSIS — F32.A ANXIETY AND DEPRESSION: ICD-10-CM

## 2025-03-21 DIAGNOSIS — M25.611 DECREASED ROM OF RIGHT SHOULDER: ICD-10-CM

## 2025-03-21 DIAGNOSIS — Z12.31 ENCOUNTER FOR SCREENING MAMMOGRAM FOR MALIGNANT NEOPLASM OF BREAST: ICD-10-CM

## 2025-03-21 DIAGNOSIS — E66.01 MORBID OBESITY DUE TO EXCESS CALORIES (HCC): ICD-10-CM

## 2025-03-21 DIAGNOSIS — E11.9 CONTROLLED TYPE 2 DIABETES MELLITUS WITHOUT COMPLICATION, WITHOUT LONG-TERM CURRENT USE OF INSULIN (HCC): ICD-10-CM

## 2025-03-21 DIAGNOSIS — F41.9 ANXIETY AND DEPRESSION: ICD-10-CM

## 2025-03-21 LAB
BASOPHILS # BLD AUTO: 0.11 X10(3) UL (ref 0–0.2)
BASOPHILS NFR BLD AUTO: 1.3 %
CREAT UR-SCNC: 147.7 MG/DL
DEPRECATED HBV CORE AB SER IA-ACNC: 12 NG/ML
EOSINOPHIL # BLD AUTO: 0.27 X10(3) UL (ref 0–0.7)
EOSINOPHIL NFR BLD AUTO: 3.2 %
ERYTHROCYTE [DISTWIDTH] IN BLOOD BY AUTOMATED COUNT: 15.9 %
EST. AVERAGE GLUCOSE BLD GHB EST-MCNC: 108 MG/DL (ref 68–126)
HBA1C MFR BLD: 5.4 % (ref ?–5.7)
HCT VFR BLD AUTO: 42.2 %
HGB BLD-MCNC: 13.6 G/DL
IMM GRANULOCYTES # BLD AUTO: 0.05 X10(3) UL (ref 0–1)
IMM GRANULOCYTES NFR BLD: 0.6 %
IRON SATN MFR SERPL: 9 %
IRON SERPL-MCNC: 31 UG/DL
LYMPHOCYTES # BLD AUTO: 2.03 X10(3) UL (ref 1–4)
LYMPHOCYTES NFR BLD AUTO: 23.7 %
MCH RBC QN AUTO: 26.3 PG (ref 26–34)
MCHC RBC AUTO-ENTMCNC: 32.2 G/DL (ref 31–37)
MCV RBC AUTO: 81.6 FL
MICROALBUMIN UR-MCNC: 5.9 MG/DL
MICROALBUMIN/CREAT 24H UR-RTO: 39.9 UG/MG (ref ?–30)
MONOCYTES # BLD AUTO: 0.52 X10(3) UL (ref 0.1–1)
MONOCYTES NFR BLD AUTO: 6.1 %
NEUTROPHILS # BLD AUTO: 5.57 X10 (3) UL (ref 1.5–7.7)
NEUTROPHILS # BLD AUTO: 5.57 X10(3) UL (ref 1.5–7.7)
NEUTROPHILS NFR BLD AUTO: 65.1 %
PLATELET # BLD AUTO: 428 10(3)UL (ref 150–450)
RBC # BLD AUTO: 5.17 X10(6)UL
TOTAL IRON BINDING CAPACITY: 357 UG/DL (ref 250–425)
TRANSFERRIN SERPL-MCNC: 244 MG/DL (ref 250–380)
TSI SER-ACNC: 2.02 UIU/ML (ref 0.55–4.78)
WBC # BLD AUTO: 8.6 X10(3) UL (ref 4–11)

## 2025-03-21 PROCEDURE — 82570 ASSAY OF URINE CREATININE: CPT

## 2025-03-21 PROCEDURE — 83550 IRON BINDING TEST: CPT

## 2025-03-21 PROCEDURE — 36415 COLL VENOUS BLD VENIPUNCTURE: CPT

## 2025-03-21 PROCEDURE — 83540 ASSAY OF IRON: CPT

## 2025-03-21 PROCEDURE — 82043 UR ALBUMIN QUANTITATIVE: CPT

## 2025-03-21 PROCEDURE — 85025 COMPLETE CBC W/AUTO DIFF WBC: CPT

## 2025-03-21 PROCEDURE — 99215 OFFICE O/P EST HI 40 MIN: CPT | Performed by: PHYSICIAN ASSISTANT

## 2025-03-21 PROCEDURE — 83036 HEMOGLOBIN GLYCOSYLATED A1C: CPT

## 2025-03-21 PROCEDURE — 82728 ASSAY OF FERRITIN: CPT

## 2025-03-21 PROCEDURE — 84443 ASSAY THYROID STIM HORMONE: CPT

## 2025-03-21 RX ORDER — NAPROXEN 500 MG/1
500 TABLET ORAL 2 TIMES DAILY WITH MEALS
Qty: 30 TABLET | Refills: 1 | Status: SHIPPED | OUTPATIENT
Start: 2025-03-21

## 2025-03-21 NOTE — PROGRESS NOTES
Pelvic Pain in Pregnancy: Unclear (2-3 Trimester)  You are well into your pregnancy and are having pain and pressure in your pelvic area. This is your lower belly (abdomen). Mild pelvic pressure or heaviness is very common in the latter stages of a healthy pregnancy. This is due to the growing uterus (womb). Although the exact cause of your pain is not certain, it does not appear to be dangerous. It may be due to ligaments in your belly stretching to support your uterus as it grows. The weight of your baby may also be causing pressure and pain. Pain can be caused by the bones of your pelvis shifting as your body makes room for the baby to pass through. This is known as symphysis pubis dysfunction (SPD). SPD can cause quite a bit of pain and discomfort as the due date nears.     Pain in the low back is common during pregnancy.   Home care  The following are general care guidelines:  · Avoid strenuous activities and long periods of standing. Bed rest is not needed unless your doctor has recommended it.  · Exercise for 30 minutes all or most days of the week. This will promote muscle tone, strength, and endurance. Ask your healthcare provider what exercises to do and to avoid.  · Sit in a warm (NOT hot) bath. This helps relax tight, painful muscles.  · Sleep on your side with a pillow between your legs. This helps align your pelvis.  · Eat frequent, light meals. Choose foods that are easy to digest.  · Ask your doctor whether a pregnancy support belt could help you.  · If told to by your healthcare provider, take an over-the-counter medicine, such as acetaminophen, to relieve pain. Follow instructions carefully for how much to take and how often to take it. Do not take aspirin or nonsteroidal anti-inflammatory medicines (like ibuprofen) unless you have been told to do so by your healthcare provider.  Follow-up care  Follow up with your healthcare provider, or as advised.  When to seek medical advice  Call your  Leeanna Sofia is a 49 year old female.   Chief Complaint   Patient presents with    Shoulder Pain     EJ Rm 10- Pt is here for rt shoulder pain that radiate down to her arm for the last few months    Irregular Periods     Here to discuss perimenopause symptoms     HPI:    Pt presents with the following:    Right shoulder pain x several months. Denies injury/trauma.   Pain radiates down entire right arm. Difficulty sleeping due to pain. Some tingling into right arm as well.   Also has pain in neck and right elbow.   Difficulty lifting arm above shoulder height due to pain.   Denies weakness.   She has tried rest and ibuprofen with no relief.     Mood swings. Feel more emotional and irritable. She also notes irregular periods and associates her mood changes to perimenopause.   She currently taking zoloft 100 mg daily and wellbutrin 300 mg daily. However, she admits she is not always great about remembering her zoloft everyday.       Allergies:  Allergies[1]   Current Meds:  Current Outpatient Medications   Medication Sig Dispense Refill    naproxen 500 MG Oral Tab Take 1 tablet (500 mg total) by mouth 2 (two) times daily with meals. 30 tablet 1    Tirzepatide (MOUNJARO) 12.5 MG/0.5ML Subcutaneous Solution Auto-injector Inject 12.5 mg into the skin once a week. 2 mL 1    lisinopril 20 MG Oral Tab Take 1 tablet (20 mg total) by mouth daily. 90 tablet 3    metFORMIN  MG Oral Tablet 24 Hr Take 2 tablets (1,500 mg total) by mouth daily with breakfast. 180 tablet 1    MOUNJARO 12.5 MG/0.5ML Subcutaneous Solution Auto-injector       Diethylpropion HCl 25 MG Oral Tab Take 50 mg by mouth daily.      buPROPion  MG Oral Tablet 24 Hr Take 1 tablet (300 mg total) by mouth daily. 90 tablet 1    ATORVASTATIN 10 MG Oral Tab TAKE 1 TABLET BY MOUTH EVERY DAY AT NIGHT 90 tablet 1    albuterol 108 (90 Base) MCG/ACT Inhalation Aero Soln Inhale 1 puff into the lungs every 6 (six) hours as needed. 8.5 each 0     healthcare provider right away if any of these occur:   · Sudden or gradual worsening abdominal pain  · Fainting, dizziness, or weakness when standing  · Any vaginal bleeding  · Leakage of fluid from the vagina  · Decreased fetal motion  · Repeated vomiting or diarrhea  · Pain that seems to settle in one area, especially the lower right abdomen  · Blood in vomit or bowel movements (dark red or black color)  · Fever of 100.4°F (38°C) or higher  Date Last Reviewed: 6/1/2016 © 2000-2017 [employer]. [address] [place] [address]. All rights reserved. This information is not intended as a substitute for professional medical care. Always follow your healthcare professional's instructions.        Pregnancy: Your Second Trimester Changes    Each day, you and your baby are changing and growing together. Heres a quick look at whats happening to both of you.  How You Are Changing  Even when you dont notice it, your body is adapting to meet the needs of your growing baby. The changes in your body might also affect your moods.  Your body  Your uterus expands as baby grows. As the weeks go by, you will feel more pressure on your bladder, stomach, and other organs. You may notice some skin color changes on your forehead, nose, or cheeks. Freckles may darken, and moles may grow. You may notice a darker line on your abdomen between your belly button and pubic bone in the midline.  Your moods  The second trimester is often easier than the first. Still, be prepared for mood swings. These are due to the increase in hormones (chemicals that affect the way organs work) produced by your body. These mood swings are a normal part of pregnancy.  How your baby is growing       Month 4  Babys heartbeat may be heard with a Doppler (hand-held ultrasound device) by 9 to 10 weeks. Eyebrows, eyelashes and fingernails begin to form.  Month 5  You may feel your baby move. After a growth spurt, your baby nears 10  triamcinolone 0.1 % External Cream Apply topically 2 (two) times daily as needed. 60 g 1    levothyroxine 88 MCG Oral Tab Take 1 tablet (88 mcg total) by mouth daily. 90 tablet 3    Blood Glucose Monitoring Suppl (ACCU-CHEK GUIDE) w/Device Does not apply Kit 1 lancet by Finger stick route 2 (two) times daily.      Blood Glucose Monitoring Suppl (PRECISION XTRA) Does not apply Device 1 Application 2 (two) times daily.      Glucose Blood (ACCU-CHEK GUIDE) In Vitro Strip 1 STRIP BY [facility] [place]. (NON DRUG COMBO ROUTE) ROUTE DAILY.      Sertraline HCl 100 MG Oral Tab TAKE 1 TABLET BY MOUTH EVERY DAY      Lidocaine Viscous HCl 2 % Mouth/Throat Solution Take 5 mL by mouth every 3 (three) hours as needed for Pain. Spit or swallow (Patient not taking: Reported on 3/21/2025) 100 mL 0    Diethylpropion HCl ER 75 MG Oral Tablet 24 Hr Take 1 tablet (75 mg total) by mouth daily. (Patient not taking: Reported on 3/21/2025) 30 tablet 0        PMH:     Past Medical History:    SHAUN (obstructive sleep apnea)    AHI 15.1 Supine AHI 15 non-supine AHI 47.1       ROS:   GENERAL: Negative for fever, chills and fatigue. NAD.  HENT: Negative for congestion, sore throat, and ear pain.  RESPIRATORY: Negative for cough, chest tightness, shortness of breath and wheezing.    CV: Negative for chest pain, palpitations and leg swelling.   GI: Negative for nausea, vomiting, abdominal pain, diarrhea, and blood in stool.   : Negative for dysuria, hematuria and difficulty urinating.   MUSCULOSKELETAL: Negative for myalgias, back pain, joint swelling, arthralgias and gait problem.   NEURO: Negative for dizziness, syncope, weakness, numbness, tingling and headaches.   PSYCH: +mood swings       PHYSICAL EXAM:    /74   Pulse 79   Temp 97.5 °F (36.4 °C) (Temporal)   Resp 18   Ht 5' 6\" (1.676 m)   Wt 271 lb 9.6 oz (123.2 kg)   LMP 02/15/2025 (Exact Date)   SpO2 97%   BMI 43.84 kg/m²     GENERAL: NAD. A&Ox3  NECK: No LAD. No tenderness. Full ROM.    RESPIRATORY: CTAB, no R/R/W  CV: RRR, no murmurs.   UE: right posterior shoulder tenderness; pain with internal rotation, external rotation, and abduction of right arm; strength 5/5 b/l; right lateral epicondyle tenderness   NEURO: No focal deficits.   MUSCULOSKELETAL: Full ROM of all extremities. No swelling.   LE: Bilateral barefoot skin diabetic exam is normal, visualized feet and the appearance is normal.  Bilateral monofilament/sensation of both feet is normal.  Pulsation pedal pulse exam of both lower legs/feet is normal as well.  PSYCH: Appropriate mood and affect.      ASSESSMENT/ PLAN:   1. Controlled type 2 diabetes mellitus without complication, without long-term current use of insulin (HCC)  Check updated labs   Pt due for eye exam soon and advised to schedule this   - Hemoglobin A1C [E]; Future  - Microalb/Creat Ratio, Random Urine [E]; Future    2. Morbid obesity due to excess calories (HCC)  Has upcoming appt with weight loss clinic     3. Iron deficiency  Due for labs - she will complete today     4. Encounter for screening mammogram for malignant neoplasm of breast  - [facility] TERESSA 2D+3D SCREENING BILAT (CPT=77067/34197); Future    5. Hypothyroidism, unspecified type  Check labs   - TSH W Reflex To Free T4 [E]; Future    6. Anxiety and depression  Worsening is likely related to perimenopause   Continue wellbutrin 300 mg daily   Encouraged pt to take her zoloft daily as prescribed - if mood changes persist then ok to increase zoloft to 150 mg daily   F/u in June as scheduled or sooner if needed     7. Acute pain of right shoulder  Concerning for rotator cuff etiology   Naproxen bid prn   Check MRI   - MRI SHOULDER, RIGHT (CPT=73221); Future    8. Decreased ROM of right shoulder  Check MRI   - MRI SHOULDER, RIGHT (CPT=73221); Future    9. Lateral epicondylitis, right elbow  Recommend tennis elbow brace        Health Maintenance Due   Topic Date Due    Annual Depression Screening  01/01/2025    Diabetes  inches. Month 6  Babys fingerprints have formed. Your baby weighs about 1  to 2 pounds and is about 12 inches long.   Date Last Reviewed: 8/16/2015  © 8146-0482 [employer]. [address] [place] [address], [place] [address]. All rights reserved. This information is not intended as a substitute for professional medical care. Always follow your healthcare professional's instructions.        Adapting to Pregnancy: Second Trimester  Keep up the healthy habits you started in your first trimester. You might be a little more tired than normal. So plan your day wisely. Look at the tips below and choose the ones that suit your lifestyle.    If you have any questions, check with your healthcare provider.   If you work  If you can, adjust your work with your employer to fit your needs. Try these tips:  · If you stand for long periods, find ways to do some tasks while sitting. Also, try to stand with 1 foot resting on a low stool or ledge. Shift your weight from foot to foot often. Wear low-heeled shoes.  · If you sit, keep your knees level with your hips. Rest your feet on a firm surface. Sit tall with support for your low back.  · If you work long hours, ask about adjusting your schedule. Try taking shorter breaks more often.  When you travel  The second trimester may be the best time for any travel. Talk to your healthcare provider about any special plans you may need to make. Always:  · Wear a seat belt. Fasten the lap part under your belly. Wear the shoulder part also.  · Take breaks often during long trips by car or plane. Move around to stretch your legs.  · Drink plenty of fluids on flights. The air in plane cabins is very dry.  · Avoid hot climates or high altitudes if you are not used to them.  · Avoid places where the food and water might make you sick.  · Make sure you are up-to-date on all immunizations, including the flu vaccine. This is especially important when traveling overseas.  Taking time to  Care: Foot Exam (Annual)  01/01/2025    Diabetes Care: Microalb/Creat Ratio (Annual)  01/01/2025    Diabetes Care A1C  03/07/2025    Diabetes Care Dilated Eye Exam  03/30/2025    Mammogram  04/13/2025           Pt indicates understanding and agrees to the plan.     No follow-ups on file.    Evangelina Lagos PA-C           [1] No Known Allergies     relax  Find time to rest and relax at work or at home:  · Take short time-outs daily. Do relaxation exercises.  · Breathe deeply during stressful times.  · Try not to take on too much. Plan tasks for times when you have the most energy.  · Take naps when you can. Or just sit and relax.  · After week 16, avoid lying on your back for more than a few minutes. Instead, lie on your side. Switch sides often.  Continuing as lovers  Unless your healthcare provider tells you otherwise, there is no reason to stop having sex now. Blood supply increases to the pelvic area in the second trimester. Because of this, sex might be more enjoyable. Try different positions and see whats best. Also, talk to your partner about any changes in desire. Spotting may happen after sex. Be sure to let your healthcare provider know if there is heavy bleeding.  Keeping your environment safe  You can still clean house and use scented products. Just take some simple precautions:  · Wear gloves when using cleaning fluids.  · Open windows to let in fresh air. Use a fan if you paint.  · Avoid secondhand smoke.  · Dont breathe fumes from nail polish, hair spray, cleansers, or other chemicals.  Date Last Reviewed: 8/16/2015  © 3296-1664 [employer]. [address] [place] [address], [place] [address]. All rights reserved. This information is not intended as a substitute for professional medical care. Always follow your healthcare professional's instructions.

## 2025-03-21 NOTE — PATIENT INSTRUCTIONS
Kyung Gonzales PA-C  [email or web address]  [phone]  [address], [address], [place] [address]

## 2025-04-21 ENCOUNTER — PATIENT MESSAGE (OUTPATIENT)
Dept: INTERNAL MEDICINE CLINIC | Facility: CLINIC | Age: 50
End: 2025-04-21

## 2025-04-21 DIAGNOSIS — M25.611 DECREASED ROM OF RIGHT SHOULDER: ICD-10-CM

## 2025-04-21 DIAGNOSIS — M25.511 ACUTE PAIN OF RIGHT SHOULDER: Primary | ICD-10-CM

## 2025-04-22 NOTE — TELEPHONE ENCOUNTER
7. Acute pain of right shoulder  Concerning for rotator cuff etiology   Naproxen bid prn   Check MRI   - MRI SHOULDER, RIGHT (IHI=82380); Future    Evangelina, patient requesting additional recommendations for pain due to MRI not being covered. Refer to ortho?

## 2025-05-03 ENCOUNTER — LAB ENCOUNTER (OUTPATIENT)
Dept: LAB | Age: 50
End: 2025-05-03
Attending: PHYSICIAN ASSISTANT
Payer: COMMERCIAL

## 2025-05-03 ENCOUNTER — HOSPITAL ENCOUNTER (OUTPATIENT)
Dept: MAMMOGRAPHY | Age: 50
Discharge: HOME | End: 2025-05-03
Attending: PHYSICIAN ASSISTANT
Payer: COMMERCIAL

## 2025-05-03 DIAGNOSIS — E61.1 IRON DEFICIENCY: ICD-10-CM

## 2025-05-03 DIAGNOSIS — Z12.31 ENCOUNTER FOR SCREENING MAMMOGRAM FOR MALIGNANT NEOPLASM OF BREAST: ICD-10-CM

## 2025-05-03 LAB
BASOPHILS # BLD AUTO: 0.11 X10(3) UL (ref 0–0.2)
BASOPHILS NFR BLD AUTO: 1.4 %
DEPRECATED HBV CORE AB SER IA-ACNC: 18 NG/ML (ref 50–306)
DEPRECATED RDW RBC AUTO: 43.6 FL (ref 35.1–46.3)
EOSINOPHIL # BLD AUTO: 0.23 X10(3) UL (ref 0–0.7)
EOSINOPHIL NFR BLD AUTO: 3 %
ERYTHROCYTE [DISTWIDTH] IN BLOOD BY AUTOMATED COUNT: 14.6 % (ref 11–15)
HCT VFR BLD AUTO: 45.2 % (ref 35–48)
HGB BLD-MCNC: 14.1 G/DL (ref 12–16)
IMM GRANULOCYTES # BLD AUTO: 0.04 X10(3) UL (ref 0–1)
IMM GRANULOCYTES NFR BLD: 0.5 %
IRON SATN MFR SERPL: 11 % (ref 15–50)
IRON SERPL-MCNC: 36 UG/DL (ref 50–170)
LYMPHOCYTES # BLD AUTO: 2.36 X10(3) UL (ref 1–4)
LYMPHOCYTES NFR BLD AUTO: 31.1 %
MCH RBC QN AUTO: 25.6 PG (ref 26–34)
MCHC RBC AUTO-ENTMCNC: 31.2 G/DL (ref 31–37)
MCV RBC AUTO: 82.2 FL (ref 80–100)
MONOCYTES # BLD AUTO: 0.55 X10(3) UL (ref 0.1–1)
MONOCYTES NFR BLD AUTO: 7.2 %
NEUTROPHILS # BLD AUTO: 4.31 X10 (3) UL (ref 1.5–7.7)
NEUTROPHILS # BLD AUTO: 4.31 X10(3) UL (ref 1.5–7.7)
NEUTROPHILS NFR BLD AUTO: 56.8 %
PLATELET # BLD AUTO: 435 10(3)UL (ref 150–450)
RBC # BLD AUTO: 5.5 X10(6)UL (ref 3.8–5.3)
TOTAL IRON BINDING CAPACITY: 331 UG/DL (ref 250–425)
TRANSFERRIN SERPL-MCNC: 283 MG/DL (ref 250–380)
WBC # BLD AUTO: 7.6 X10(3) UL (ref 4–11)

## 2025-05-03 PROCEDURE — 36415 COLL VENOUS BLD VENIPUNCTURE: CPT

## 2025-05-03 PROCEDURE — 77067 SCR MAMMO BI INCL CAD: CPT | Performed by: PHYSICIAN ASSISTANT

## 2025-05-03 PROCEDURE — 83540 ASSAY OF IRON: CPT

## 2025-05-03 PROCEDURE — 82728 ASSAY OF FERRITIN: CPT

## 2025-05-03 PROCEDURE — 77063 BREAST TOMOSYNTHESIS BI: CPT | Performed by: PHYSICIAN ASSISTANT

## 2025-05-03 PROCEDURE — 85025 COMPLETE CBC W/AUTO DIFF WBC: CPT

## 2025-05-03 PROCEDURE — 84466 ASSAY OF TRANSFERRIN: CPT

## 2025-05-07 ENCOUNTER — TELEPHONE (OUTPATIENT)
Facility: LOCATION | Age: 50
End: 2025-05-07

## 2025-05-12 NOTE — TELEPHONE ENCOUNTER
MRI SHOULDER, RIGHT (CPT=73221)     Referral #: [id number]       Status: DENIED      If the ordering provider would like to discuss this case with a reviewer, contact [employer] at [phone]  .  Use reference Case Number: [id number]     Rational:        Notified clinical staff for follow-up, a copy of the denial letter is filed under the MEDIA tab, un-check \" Clinical Info Only \" to view the determination letter for denial rational and appeal process.     Patient notified of adverse determination via [employer]

## 2025-05-13 ENCOUNTER — OFFICE VISIT (OUTPATIENT)
Dept: INTERNAL MEDICINE CLINIC | Facility: CLINIC | Age: 50
End: 2025-05-13
Payer: COMMERCIAL

## 2025-05-13 VITALS
HEIGHT: 67 IN | DIASTOLIC BLOOD PRESSURE: 82 MMHG | RESPIRATION RATE: 18 BRPM | BODY MASS INDEX: 42.38 KG/M2 | HEART RATE: 89 BPM | WEIGHT: 270 LBS | SYSTOLIC BLOOD PRESSURE: 122 MMHG

## 2025-05-13 DIAGNOSIS — Z51.81 THERAPEUTIC DRUG MONITORING: ICD-10-CM

## 2025-05-13 DIAGNOSIS — G47.33 OSA (OBSTRUCTIVE SLEEP APNEA): ICD-10-CM

## 2025-05-13 DIAGNOSIS — D50.8 OTHER IRON DEFICIENCY ANEMIA: ICD-10-CM

## 2025-05-13 DIAGNOSIS — I10 PRIMARY HYPERTENSION: ICD-10-CM

## 2025-05-13 DIAGNOSIS — E11.9 CONTROLLED TYPE 2 DIABETES MELLITUS WITHOUT COMPLICATION, WITHOUT LONG-TERM CURRENT USE OF INSULIN (HCC): Primary | ICD-10-CM

## 2025-05-13 DIAGNOSIS — E66.01 MORBID OBESITY DUE TO EXCESS CALORIES (HCC): ICD-10-CM

## 2025-05-13 PROCEDURE — 99214 OFFICE O/P EST MOD 30 MIN: CPT | Performed by: INTERNAL MEDICINE

## 2025-05-13 RX ORDER — NALTREXONE HYDROCHLORIDE 50 MG/1
25 TABLET, FILM COATED ORAL DAILY
Qty: 30 TABLET | Refills: 0 | Status: SHIPPED | OUTPATIENT
Start: 2025-05-13

## 2025-05-13 NOTE — PROGRESS NOTES
HISTORY OF PRESENT ILLNESS  Chief Complaint   Patient presents with    Weight Problem     Referred by PCP, Was on Ozempic but plateau and is currently on Mounjaro        Leeanna Sofia is a 49 year old female here for follow up in medical weight loss program.   [facility] Follow Up    General Information  Nutrition Recall  Exercise     Sleep              History of Present Illness  Leeanna Sofia is a 49 year old female with obesity and sleep apnea who presents with a plateau in her weight loss journey.    She has experienced a plateau in her weight loss for the past six months. Initially weighing 331 pounds, she lost over 50 pounds but has not seen any change since October. She has been on Mounjaro 12.5 mg for almost a year and feels she has plateaued on that dose. She has a history of emotional eating, particularly triggered by stress and convenience, and struggles with night eating, often craving chocolate or sweets. She also has a preference for pasta and carbohydrates as comfort food.    She has a history of sleep apnea but has not used a CPAP machine due to insurance coverage issues. No noticeable symptoms of sleep apnea are currently reported.    She has been diagnosed with iron deficiency, which she believes contributes to her fatigue. She feels winded and fatigued, which she attributes to her iron deficiency. She is scheduled to see a hematologist to address this issue.    Her physical activity is limited due to joint pain, which makes movement uncomfortable. She tries to engage in low-impact activities and has recently started walking a one-mile loop around her neighborhood with her son. She aims to do this more regularly, depending on the weather and her schedule.    Her current medications include bupropion, which was increased by her provider, resulting in a 20-pound weight loss between April and October of the previous year. She has previously tried Contrave every other day and felt it was  effective.    Wt Readings from Last 6 Encounters:   05/13/25 270 lb (122.5 kg)   03/21/25 271 lb 9.6 oz (123.2 kg)   12/07/24 269 lb (122 kg)   09/07/24 282 lb (127.9 kg)   04/05/24 285 lb (129.3 kg)   03/01/24 281 lb (127.5 kg)              Breakfast Lunch Dinner Snacks Fluids   Reviewed           REVIEW OF SYSTEMS  GENERAL HEALTH: feels well otherwise, denied any fevers chills or night sweats   RESPIRATORY: denies shortness of breath   CARDIOVASCULAR: denies chest pain  GI: denies abdominal pain    EXAM  /82   Pulse 89   Resp 18   Ht 5' 7\" (1.702 m)   Wt 270 lb (122.5 kg)   LMP 02/15/2025 (Exact Date)   BMI 42.29 kg/m²   GENERAL: well developed, well nourished,in no apparent distress, A/O x3  SKIN: no rashes,no suspicious lesions  HEENT: atraumatic, normocephalic, OP-clear, PERRL  NECK: supple,no adenopathy  LUNGS: clear to auscultation bilaterally   CARDIO: RRR without murmur  GI: good BS's,NT/ND, no masses or HSM  EXTREMITIES: no cyanosis, no clubbing, no edema    Lab Results   Component Value Date    WBC 7.6 05/03/2025    RBC 5.50 (H) 05/03/2025    HGB 14.1 05/03/2025    HCT 45.2 05/03/2025    MCV 82.2 05/03/2025    MCH 25.6 (L) 05/03/2025    MCHC 31.2 05/03/2025    RDW 14.6 05/03/2025    .0 05/03/2025     Lab Results   Component Value Date    GLU 90 08/31/2024    BUN 11 08/31/2024    BUNCREA 16.9 08/31/2024    CREATSERUM 0.65 08/31/2024    ANIONGAP 7 08/31/2024    GFRNAA 102 05/21/2022    GFRAA 118 05/21/2022    CA 9.1 08/31/2024    OSMOCALC 297 (H) 08/31/2024    ALKPHO 84 08/31/2024    AST 14 08/31/2024    ALT 12 08/31/2024    BILT 0.3 08/31/2024    TP 7.1 08/31/2024    ALB 4.0 08/31/2024    GLOBULIN 3.1 08/31/2024     08/31/2024    K 4.3 08/31/2024     08/31/2024    CO2 28.0 08/31/2024     Lab Results   Component Value Date     03/21/2025    A1C 5.4 03/21/2025     Lab Results   Component Value Date    CHOLEST 166 08/31/2024    TRIG 104 08/31/2024    HDL 64 (H) 08/31/2024     LDL 83 08/31/2024    VLDL 16 08/31/2024    NONHDLC 102 08/31/2024     Lab Results   Component Value Date    TSH 2.020 03/21/2025     No results found for: \"B12\", \"VITB12\"  Lab Results   Component Value Date    VITD 75.94 09/13/2021       Medications Ordered Prior to Encounter[1]    ASSESSMENT  Analyzed weight data:  Weight Calculations  Initial Weight: 270 lbs  Initial Weight Date: 05/13/25  Today's Weight: 270 lbs  5% Goal: 13.5  10% Goal: 27  Total Weight Loss: 0 lbs  MBSAQIP Information Bariatric Seminar Date: 5/13/25 Patient type:   Lifestyle   Initial Body Fat %: 47.2  Today's Body Fat Female: 47.2 Change in Body Fat   %: 0   Date of Initial Weight: 05/13/2025 Initial Weight: 270 Today's Weight: 270   Weightloss to Date: 0   Weightloss Percentage: 0 5% Goal: 13.5 10% Goal: 27    Initial BMI: 42.1 Today's BMI: 42.1 Change in BMI: 0      Female Fat Mass: 127.44 Female Lean Mass: 142.56      Have any comorbidities improved since the last visit?   Hypertension DM 2 Dyslipidemia Osteoarthritis Sleep Apnea                    Diagnoses and all orders for this visit:    Controlled type 2 diabetes mellitus without complication, without long-term current use of insulin ([facility])  -     [facility] Weight Management Medical Nutrition Therapy DIETITIAN [facility] Location  -     OP REFERRAL TO [facility] Weight Management Medical Nutrition Therapy - DIETITIAN [facility] Location  -     OP REFERRAL [facility]    Morbid obesity due to excess calories (HCC)  -     [facility] Weight Management Medical Nutrition Therapy DIETITIAN [facility] Location  -     OP REFERRAL TO [facility] Weight Management Medical Nutrition Therapy - DIETITIAN [facility] Location  -     OP REFERRAL [facility]    Primary hypertension  -     [facility] Weight Management Medical Nutrition Therapy DIETITIAN [facility] Location  -     OP REFERRAL TO [facility] Weight  Management Medical Nutrition Therapy - DIETITIAN - [place] Location  -     OP REFERRAL [facility]    Therapeutic drug monitoring  -     [facility] Weight Management Medical Nutrition Therapy DIETITIAN - [place] Location  -     OP REFERRAL TO [facility] Weight Management Medical Nutrition Therapy - DIETITIAN - [place] Location  -     OP REFERRAL [facility]    Other iron deficiency anemia  -     [facility] Weight Management Medical Nutrition Therapy DIETITIAN - [place] Location  -     OP REFERRAL TO [facility] Weight Management Medical Nutrition Therapy - DIETITIAN - [place] Location  -     OP REFERRAL [facility]    SHAUN (obstructive sleep apnea)  -     OP REFERRAL TO [facility] Weight Management Medical Nutrition Therapy - DIETITIAN - [place] Location  -     OP REFERRAL [facility]    Other orders  -     naltrexone 50 MG Oral Tab; Take 0.5 tablets (25 mg total) by mouth daily.        PLAN  BMI 53.2, 331 lb at start of treatment 5/2021.   Assessment & Plan  Obesity  Obesity with weight plateau at 270 lb, complicated by emotional eating and carbohydrate cravings. Previous weight loss with bupropion and Mounjaro. Prefers naltrexone due to positive experience with Contrave. Discussed emotional eating, proactive habits, physical activity, health coaching, and therapy. Informed about naltrexone side effects and alcohol caution.  - Initiate naltrexone 25 mg, half a tablet with dinner. Increase to 50 mg if no improvement in two weeks.  -reviewed interaction with alcohol  - Refer to dietitian for nutritional guidance.  - Refer to therapist for emotional support and goal setting.  - Provide contact information for health coaching at [facility].    Sleep apnea  Sleep apnea with no CPAP use due to insurance issues. Discussed weight loss as primary treatment strategy.    Iron deficiency anemia  Iron  deficiency anemia with fatigue and difficulty in physical activity. Scheduled hematology evaluation for management.  - Proceed with scheduled hematology appointment for further evaluation and management of iron deficiency anemia.    HTN-stable controlled    There are no Patient Instructions on file for this visit.    No follow-ups on file.    Patient verbalizes understanding.    Lila Chacon MD           [1]   Current Outpatient Medications on File Prior to Visit   Medication Sig Dispense Refill    naproxen 500 MG Oral Tab Take 1 tablet (500 mg total) by mouth 2 (two) times daily with meals. 30 tablet 1    Tirzepatide (MOUNJARO) 12.5 MG/0.5ML Subcutaneous Solution Auto-injector Inject 12.5 mg into the skin once a week. 2 mL 1    lisinopril 20 MG Oral Tab Take 1 tablet (20 mg total) by mouth daily. 90 tablet 3    metFORMIN  MG Oral Tablet 24 Hr Take 2 tablets (1,500 mg total) by mouth daily with breakfast. 180 tablet 1    buPROPion  MG Oral Tablet 24 Hr Take 1 tablet (300 mg total) by mouth daily. 90 tablet 1    ATORVASTATIN 10 MG Oral Tab TAKE 1 TABLET BY MOUTH EVERY DAY AT NIGHT 90 tablet 1    albuterol 108 (90 Base) MCG/ACT Inhalation Aero Soln Inhale 1 puff into the lungs every 6 (six) hours as needed. 8.5 each 0    triamcinolone 0.1 % External Cream Apply topically 2 (two) times daily as needed. 60 g 1    levothyroxine 88 MCG Oral Tab Take 1 tablet (88 mcg total) by mouth daily. 90 tablet 3    Blood Glucose Monitoring Suppl (ACCU-CHEK GUIDE) w/Device Does not apply Kit 1 lancet by Finger stick route 2 (two) times daily.      Blood Glucose Monitoring Suppl (PRECISION XTRA) Does not apply Device 1 Application 2 (two) times daily.      Glucose Blood (ACCU-CHEK GUIDE) In Vitro Strip 1 STRIP BY [facility]. (NON DRUG COMBO ROUTE) ROUTE DAILY.      Sertraline HCl 100 MG Oral Tab TAKE 1 TABLET BY MOUTH EVERY DAY      MOUNJARO 12.5 MG/0.5ML Subcutaneous Solution Auto-injector        No current facility-administered  medications on file prior to visit.

## 2025-05-13 NOTE — PROGRESS NOTES
The following individual(s) verbally consented to be recorded using ambient AI listening technology and understand that they can each withdraw their consent to this listening technology at any point by asking the clinician to turn off or pause the recording:    Patient name: Leeanna Sofia  Additional names:

## 2025-05-19 ENCOUNTER — TELEPHONE (OUTPATIENT)
Age: 50
End: 2025-05-19

## 2025-05-19 ENCOUNTER — OFFICE VISIT (OUTPATIENT)
Age: 50
End: 2025-05-19
Attending: INTERNAL MEDICINE
Payer: COMMERCIAL

## 2025-05-19 ENCOUNTER — OFFICE VISIT (OUTPATIENT)
Dept: INTERNAL MEDICINE CLINIC | Facility: CLINIC | Age: 50
End: 2025-05-19
Payer: COMMERCIAL

## 2025-05-19 VITALS
TEMPERATURE: 98 F | RESPIRATION RATE: 16 BRPM | SYSTOLIC BLOOD PRESSURE: 139 MMHG | HEART RATE: 95 BPM | OXYGEN SATURATION: 98 % | BODY MASS INDEX: 42 KG/M2 | WEIGHT: 268.81 LBS | DIASTOLIC BLOOD PRESSURE: 83 MMHG

## 2025-05-19 VITALS — HEIGHT: 67 IN | BODY MASS INDEX: 42.19 KG/M2 | WEIGHT: 268.81 LBS

## 2025-05-19 DIAGNOSIS — E66.01 MORBID OBESITY DUE TO EXCESS CALORIES (HCC): ICD-10-CM

## 2025-05-19 DIAGNOSIS — E61.1 IRON DEFICIENCY: Primary | ICD-10-CM

## 2025-05-19 DIAGNOSIS — E11.9 CONTROLLED TYPE 2 DIABETES MELLITUS WITHOUT COMPLICATION, WITHOUT LONG-TERM CURRENT USE OF INSULIN (HCC): Primary | ICD-10-CM

## 2025-05-19 DIAGNOSIS — D50.8 OTHER IRON DEFICIENCY ANEMIA: ICD-10-CM

## 2025-05-19 DIAGNOSIS — I10 PRIMARY HYPERTENSION: ICD-10-CM

## 2025-05-19 NOTE — PROGRESS NOTES
Hematology/Oncology Initial Consultation Note    Patient Name: Leeanna Sofia  Medical Record Number: [id number]    YOB: 1975   Date of Consultation: 2025   PCP: Neri Escobar DO    Reason for Consultation:  Leeanna Sofia was seen today for the diagnosis of iron deficiency    History of Present Illness:      50 y/o F PMH SHAUN, iron deficiency who presents for iron deficiency.    - previously was told to take iron supplement when she was pregnant before, but never got iron infusion  - has ongoing fatigue, hair loss. Perimenopausal, periods are getting heavier, and she is already on oral iron which is not relieving her iron deficiency  - up to date on preventive cancer screenings. Father  from amyloidosis  - had colonoscopy in 3/2023 with Dr Richardson      Past Medical History:  Past Medical History[1]    Past Surgical History[2]    Home Medications:  Current Medications[3]  -------  Medications Ordered Prior to Encounter[4]    Allergies:   Allergies[5]    Psychosocial History:  Social History     Social History Narrative    Not on file     Short Social Hx on File[6]    Family Medical History:  Family History[7]    Review of Systems:  A 10-point ROS was done with pertinent positives and negative per the HPI    Vital Signs:  Height: --  Weight: 121.9 kg (268 lb 12.8 oz) ( 1328)  BSA (Calculated - sq m): --  Pulse: 95 ( 1328)  BP: 139/83 ( 132)  Temp: 98.2 °F (36.8 °C) (1328)  Do Not Use - Resp Rate: --  SpO2: 98 % (1328)    Wt Readings from Last 6 Encounters:   25 121.9 kg (268 lb 12.8 oz)   25 122.5 kg (270 lb)   25 123.2 kg (271 lb 9.6 oz)   24 122 kg (269 lb)   24 127.9 kg (282 lb)   24 129.3 kg (285 lb)         Physical Examination:  General: Patient is alert and oriented, not in acute distress  Psych: Mood and affect are appropriate  Eyes: EOMI, PERRL  ENT: Oropharynx is clear, no adenopathy  CV: no LE  edema  Respiratory: Non-labored respirations  GI/Abd: Soft, non-tender, no hepatosplenomegaly  Neurological: Grossly intact   Lymphatics: No palpable cervical, supraclavicular, axillary, or inguinal lymphadenopathy    Laboratory:  Lab Results   Component Value Date    WBC 7.6 05/03/2025    WBC 8.6 03/21/2025    WBC 9.4 03/08/2024    HGB 14.1 05/03/2025    HGB 13.6 03/21/2025    HGB 13.1 03/08/2024    HCT 45.2 05/03/2025    MCV 82.2 05/03/2025    MCH 25.6 (L) 05/03/2025    MCHC 31.2 05/03/2025    RDW 14.6 05/03/2025    .0 05/03/2025    .0 03/21/2025    .0 03/08/2024     Lab Results   Component Value Date    GLU 90 08/31/2024    BUN 11 08/31/2024    BUNCREA 16.9 08/31/2024    CREATSERUM 0.65 08/31/2024    CREATSERUM 0.70 03/08/2024    CREATSERUM 0.71 05/21/2022    ANIONGAP 7 08/31/2024    GFRNAA 102 05/21/2022    GFRAA 118 05/21/2022    CA 9.1 08/31/2024    OSMOCALC 297 (H) 08/31/2024    ALKPHO 84 08/31/2024    AST 14 08/31/2024    ALT 12 08/31/2024    BILT 0.3 08/31/2024    TP 7.1 08/31/2024    ALB 4.0 08/31/2024    GLOBULIN 3.1 08/31/2024     08/31/2024    K 4.3 08/31/2024     08/31/2024    CO2 28.0 08/31/2024     No results found for: \"PTT\", \"PT\", \"INR\"    Impression & Plan:     Iron deficiency  - iron studies consistent with iron deficiency  - schedule 1000mg IV iron dextran, close monitoring in infusion for reaction  - repeat CBC, iron, tibc, ferritin in 3 months to reassess iron stores      Daniel Kimble MD  [facility] Hematology Oncology           [1]   Past Medical History:   SHAUN (obstructive sleep apnea)    AHI 15.1 Supine AHI 15 non-supine AHI 47.1   [2] No past surgical history on file.  [3]   No outpatient medications have been marked as taking for the 5/19/25 encounter (Office Visit) with Daniel Kimble MD.   [4]   Current Outpatient Medications on File Prior to Visit   Medication Sig Dispense Refill    naltrexone 50 MG Oral Tab Take 0.5 tablets (25 mg total) by mouth  daily. 30 tablet 0    naproxen 500 MG Oral Tab Take 1 tablet (500 mg total) by mouth 2 (two) times daily with meals. 30 tablet 1    Tirzepatide (MOUNJARO) 12.5 MG/0.5ML Subcutaneous Solution Auto-injector Inject 12.5 mg into the skin once a week. 2 mL 1    lisinopril 20 MG Oral Tab Take 1 tablet (20 mg total) by mouth daily. 90 tablet 3    metFORMIN  MG Oral Tablet 24 Hr Take 2 tablets (1,500 mg total) by mouth daily with breakfast. 180 tablet 1    buPROPion  MG Oral Tablet 24 Hr Take 1 tablet (300 mg total) by mouth daily. 90 tablet 1    ATORVASTATIN 10 MG Oral Tab TAKE 1 TABLET BY MOUTH EVERY DAY AT NIGHT 90 tablet 1    albuterol 108 (90 Base) MCG/ACT Inhalation Aero Soln Inhale 1 puff into the lungs every 6 (six) hours as needed. 8.5 each 0    triamcinolone 0.1 % External Cream Apply topically 2 (two) times daily as needed. 60 g 1    levothyroxine 88 MCG Oral Tab Take 1 tablet (88 mcg total) by mouth daily. 90 tablet 3    Blood Glucose Monitoring Suppl (ACCU-CHEK GUIDE) w/Device Does not apply Kit 1 lancet by Finger stick route 2 (two) times daily.      Blood Glucose Monitoring Suppl (PRECISION XTRA) Does not apply Device 1 Application 2 (two) times daily.      Glucose Blood (ACCU-CHEK GUIDE) In Vitro Strip 1 STRIP BY [facility]. (NON DRUG COMBO ROUTE) ROUTE DAILY.      Sertraline HCl 100 MG Oral Tab TAKE 1 TABLET BY MOUTH EVERY DAY       No current facility-administered medications on file prior to visit.   [5] No Known Allergies  [6]   Social History  Socioeconomic History    Marital status:    Tobacco Use    Smoking status: Never    Smokeless tobacco: Never    Tobacco comments:     socially in college 20 years ago   Vaping Use    Vaping status: Never Used   Substance and Sexual Activity    Alcohol use: Yes     Comment: occ    Drug use: Never     Social Drivers of Health     Food Insecurity: No Food Insecurity (3/21/2025)    NCSS - Food Insecurity     Worried About Running Out of Food in the Last  Year: No     Ran Out of Food in the Last Year: No   Transportation Needs: No Transportation Needs (3/21/2025)    NCSS - Transportation     Lack of Transportation: No   Housing Stability: Not At Risk (3/21/2025)    NCSS - Housing/Utilities     Has Housing: Yes     Worried About Losing Housing: No     Unable to Get Utilities: No   [7]   Family History  Problem Relation Age of Onset    Breast Cancer Neg

## 2025-05-19 NOTE — PROGRESS NOTES
INITIAL OUTPATIENT NUTRITION CONSULTATION        Nutrition Assessment    Medical Diagnosis: Obesity, Type 2 DM, and thyroid disease    Physical Findings: fatigue, knee pain, joint pain, and ankle pain, get 6 hours of sleep per night.    Client Age and Gender: 49 year old female    Marital Status and Occupation: Lives with  and son.  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      Labs:   No components found for: \"HGBA1C\"  Triglycerides   Date Value Ref Range Status   08/31/2024 104 30 - 149 mg/dL Final     Comment:     Reference interval for fasting triglycerides  Desirable: <150 mg/dL  Borderline: 150-199 mg/dL  High: 200-499 mg/dL  Very High: >=500 mg/dL           LDL Cholesterol   Date Value Ref Range Status   08/31/2024 83 <100 mg/dL Final     Comment:     Optimal            <100 mg/dL   Near/Above OptimaL 100-129 mg/dL   Borderline High    130-159 mg/dL   High               160-189 mg/dL    Very High          >190 mg/dL          HDL Cholesterol   Date Value Ref Range Status   08/31/2024 64 (H) 40 - 59 mg/dL Final     Comment:     Interpretive Information:   An HDL cholesterol <40 mg/dL is low and constitutes a coronary heart disease risk factor. An HDL cholesterol >60 mg/dL is a negative risk factor for coronary heart disease.         AST   Date Value Ref Range Status   08/31/2024 14 <34 U/L Final     ALT   Date Value Ref Range Status   08/31/2024 12 10 - 49 U/L Final         Height:  Ht Readings from Last 1 Encounters:   05/13/25 5' 7\" (1.702 m)       Weight:   Wt Readings from Last 2 Encounters:   05/13/25 270 lb   03/21/25 271 lb 9.6 oz       BMI Readings from Last 1 Encounters:   05/13/25 42.29 kg/m²       Weight change: Decrease of 1.2 lbs in the past week    Goal weight/Health status:250 lbs-LTG under 200 lbs, blood sugars stable    Diet/Weight History: Per pt with excess weight beginning in adolescence, especially in college gained 100 lbs over 4 years.  Per pt has  tried WW multiple times, medical weight loss 20 yrs ago-used high protein manufactured foods.  Feels she gets into ruts  Per pt  lbs, lost over 50 lbs but has not seen a change since Oct., hx emotional eating triggered by stress and convenience struggles with night eating, sweet cravings, preference for pasta and carbs and comfort food.      Current Diet: calorie reduction    Food/Beverage Intake:   Wakes 6 am  Breakfast: 7 am-8:30 am Premier protein shake with coffee and banana or eggs on weekend  Snack: skips  Lunch: noon-chicken salad with crackers or chili or sandwich  Snack:3-4 pm-pretzels or cheese  Dinner: 6-7 pm- take out or grilled chicken sandwich or pizza or pork chops with rice and green beans  Snack: skip or fruit or chips  Beverages: sparkling water 24-36 oz, regular coffee 1 cup + 2 shots of espresso and Diet Coke 1 can per day     Vitamins/mineral supplements:iron daily     Meal pattern: 3 meals/d, 1-2 snacks/d    Number of meals/week eaten at restaurants: 2-3 x/week        Alcohol Intake: very seldom once per month- 1 drink    Estimated current caloric intake: 2700 cals/d    Estimated caloric needs for weight loss: 2700-363=1708 cals/d for 1 pounds/week weight loss    Physical Activity: walks 20-40 - 3 days per week with no current strength training     Food Journal: no, used ww reina in past    Spent 49 minutes in consultation with the patient.      Nutrition Intervention/Education:  Comprehensive nutrition education and evaluation provided for weight loss. Met with pt for initial visit. Pt is being followed by Dr. Chacon for medical weight management.  Pt is taking naltrexone and feels she does not think about food, feels full- curb appetite and cravings.  Per pt with excess weight beginning in adolescence, especially in college gained 100 lbs over 4 years.  Per pt has tried WW multiple times, medical weight loss 20 yrs ago-used high protein manufactured foods.  Feels she gets into ruts  Per pt   lbs, lost over 50 lbs but has not seen a change since Oct., hx emotional eating triggered by stress and convenience struggles with night eating, sweet cravings, preference for pasta and carbs and comfort food. Per diet recall, suspect current diet is low in protein and produce.   Discussed benefits of including protein and produce with meals and snacks.  Provided guidance, ideas and recipe for including protein with produce in diet.  Pt verbalized understanding.  Per pt is drinking ~ 24-36 oz of water per day. Discussed ideas for increasing hydration in diet.  Pt verbalized understanding.  Per pt is exercising regularly by walking, however, not doing any strength training.  Discussed ideas to increase exercise in small increments.  Pt verbalized understanding.  Patient agreed to goals below.    Goals:     1.  Keep a food record, My Net Diary, select the macros dashboard.  2.  Strive to consume at least 4-6 meals/snacks per day.  Include protein and produce when you eat.  Aim  for  grams of protein per day.  Try to keep the carbohydrates at 100-120 grams per day or less.    3.  Practice some mindful eating strategies, chew food 20-30 times before swallowing, eat food slowly, use smaller plates/bowls/utensils.  Make the meals last 30 minutes.  4.  Aim for 64 oz per day of water.  (Try Protein 2 O water, adding True Lemon, Crystal Light, use a measured container).  5.  Taper caffeine. Switch to decaf coffee.  6.  Exercise with a goal of 30 minutes per day for exercise (for example,walking). (I burn 8.9 calories per minute of walking).  7.  Strength training 10 minutes 3 days per week.  (7 minute workout song on You Tube) or (Gym Rat no more-18 at home exercises)     Monitoring/Evaluation:  Follow up appt scheduled with dietitian          Deepika Santiago RD, LDN

## 2025-05-19 NOTE — PATIENT INSTRUCTIONS
Goals:     1.  Keep a food record, My Net Diary, select the macros dashboard.  2.  Strive to consume at least 4-6 meals/snacks per day.  Include protein and produce when you eat.  Aim  for  grams of protein per day.  Try to keep the carbohydrates at 100-120 grams per day or less.    3.  Practice some mindful eating strategies, chew food 20-30 times before swallowing, eat food slowly, use smaller plates/bowls/utensils.  Make the meals last 30 minutes.  4.  Aim for 64 oz per day of water.  (Try Protein 2 O water, adding True Lemon, Crystal Light, use a measured container).  5.  Taper caffeine. Switch to decaf coffee.  6.  Exercise with a goal of 30 minutes per day for exercise (for example,walking). (I burn 8.9 calories per minute of walking).  7.  Strength training 10 minutes 3 days per week.  (7 minute workout song on You Tube) or (Gym Rat no more-18 at home exercises)

## 2025-05-20 ENCOUNTER — TELEPHONE (OUTPATIENT)
Age: 50
End: 2025-05-20

## 2025-05-20 DIAGNOSIS — E11.9 CONTROLLED TYPE 2 DIABETES MELLITUS WITHOUT COMPLICATION, WITHOUT LONG-TERM CURRENT USE OF INSULIN (HCC): ICD-10-CM

## 2025-05-23 RX ORDER — TIRZEPATIDE 12.5 MG/.5ML
12.5 INJECTION, SOLUTION SUBCUTANEOUS WEEKLY
Qty: 6 ML | Refills: 1 | Status: SHIPPED | OUTPATIENT
Start: 2025-05-23

## 2025-05-29 ENCOUNTER — OFFICE VISIT (OUTPATIENT)
Age: 50
End: 2025-05-29
Attending: INTERNAL MEDICINE
Payer: COMMERCIAL

## 2025-05-29 VITALS
BODY MASS INDEX: 42 KG/M2 | RESPIRATION RATE: 18 BRPM | HEART RATE: 78 BPM | WEIGHT: 269.63 LBS | DIASTOLIC BLOOD PRESSURE: 72 MMHG | OXYGEN SATURATION: 92 % | SYSTOLIC BLOOD PRESSURE: 116 MMHG | TEMPERATURE: 98 F

## 2025-05-29 DIAGNOSIS — E61.1 IRON DEFICIENCY: Primary | ICD-10-CM

## 2025-05-29 RX ORDER — LEVOTHYROXINE SODIUM 88 UG/1
88 TABLET ORAL DAILY
Qty: 90 TABLET | Refills: 3 | Status: SHIPPED | OUTPATIENT
Start: 2025-05-29

## 2025-05-29 NOTE — PROGRESS NOTES
Education Record    Learner:  Patient    Disease / Diagnosis: Iron deficiency     Barriers / Limitations:  None   Comments:    Method:  Discussion   Comments:    General Topics:  Medication, Side effects and symptom management, Plan of care reviewed, and Fall risk and prevention   Comments:    Outcome:  Shows understanding   Comments:    Infed iron test dose and full dose administered per order. Patient tolerated infusion well. Patient instructed to have labs rechecked in approx 3 months - she verbalized understanding. Discharged ambulatory in stable condition.

## 2025-06-02 RX ORDER — METFORMIN HYDROCHLORIDE 750 MG/1
1500 TABLET, EXTENDED RELEASE ORAL
Qty: 180 TABLET | Refills: 3 | Status: SHIPPED | OUTPATIENT
Start: 2025-06-02

## 2025-06-02 NOTE — TELEPHONE ENCOUNTER
Refill Per Protocol     Requested Prescriptions   Pending Prescriptions Disp Refills    METFORMIN  MG Oral Tablet 24 Hr [Pharmacy Med Name: METFORMIN HCL  MG TABLET] 180 tablet 1     Sig: TAKE 2 TABLETS (1,500 MG TOTAL) BY MOUTH EVERY DAY WITH BREAKFAST       Diabetes Medication Protocol Passed - 6/2/2025 10:58 AM        Passed - Last A1C < 7.5 and within past 6 months     Lab Results   Component Value Date    A1C 5.4 03/21/2025             Passed - In person appointment or virtual visit in the past 6 mos or appointment in next 3 mos     Recent Outpatient Visits              4 days ago Iron deficiency    [facility] [place]    Office Visit    2 weeks ago Controlled type 2 diabetes mellitus without complication, without long-term current use of insulin (HCC) [E11.9]    [address] Deepika Santiago RD    Office Visit    2 weeks ago Iron deficiency    [facility] [place] Daniel Kimble MD    Office Visit    2 weeks ago Controlled type 2 diabetes mellitus without complication, without long-term current use of insulin (HCC)    [address] Lila Chacon MD    Office Visit    2 months ago Controlled type 2 diabetes mellitus without complication, without long-term current use of insulin (HCC)    [address] Evangelina Lagos PA-C    Office Visit          Future Appointments         Provider Department Appt Notes    In 2 days Evangelina Lagos PA-C [address] Follow up and med checl    In 2 weeks Melita Borrero APN [address]     In 1 month Melita Borrero APN [address]     In 2 months Deepika Santiago RD [address],  [place]     In 2 months Lila Chacon MD [facility], [address], [place] per NS                    Passed - Microalbumin procedure in past 12 months or taking ACE/ARB        Passed - EGFRCR or GFRNAA > 50     GFR Evaluation  EGFRCR: 109 , resulted on 8/31/2024          Passed - GFR in the past 12 months        Passed - Medication is active on med list

## 2025-06-04 ENCOUNTER — OFFICE VISIT (OUTPATIENT)
Dept: INTERNAL MEDICINE CLINIC | Facility: CLINIC | Age: 50
End: 2025-06-04
Payer: COMMERCIAL

## 2025-06-04 VITALS
WEIGHT: 265.81 LBS | OXYGEN SATURATION: 98 % | HEART RATE: 84 BPM | HEIGHT: 67 IN | TEMPERATURE: 98 F | SYSTOLIC BLOOD PRESSURE: 124 MMHG | RESPIRATION RATE: 18 BRPM | BODY MASS INDEX: 41.72 KG/M2 | DIASTOLIC BLOOD PRESSURE: 80 MMHG

## 2025-06-04 DIAGNOSIS — E78.5 HYPERLIPIDEMIA, UNSPECIFIED HYPERLIPIDEMIA TYPE: ICD-10-CM

## 2025-06-04 DIAGNOSIS — E61.1 IRON DEFICIENCY: ICD-10-CM

## 2025-06-04 DIAGNOSIS — F32.A ANXIETY AND DEPRESSION: ICD-10-CM

## 2025-06-04 DIAGNOSIS — F41.9 ANXIETY AND DEPRESSION: ICD-10-CM

## 2025-06-04 DIAGNOSIS — E11.9 CONTROLLED TYPE 2 DIABETES MELLITUS WITHOUT COMPLICATION, WITHOUT LONG-TERM CURRENT USE OF INSULIN (HCC): Primary | ICD-10-CM

## 2025-06-04 DIAGNOSIS — R41.89 BRAIN FOG: ICD-10-CM

## 2025-06-04 DIAGNOSIS — E03.9 HYPOTHYROIDISM, UNSPECIFIED TYPE: ICD-10-CM

## 2025-06-04 DIAGNOSIS — L30.9 ECZEMA, UNSPECIFIED TYPE: ICD-10-CM

## 2025-06-04 DIAGNOSIS — E66.01 MORBID OBESITY DUE TO EXCESS CALORIES (HCC): ICD-10-CM

## 2025-06-04 PROCEDURE — 99214 OFFICE O/P EST MOD 30 MIN: CPT | Performed by: PHYSICIAN ASSISTANT

## 2025-06-04 NOTE — PROGRESS NOTES
Leeanna Sofia is a 49 year old female.   Chief Complaint   Patient presents with    Follow - Up     EJ Rm 1- Pt is here for f/u and med check     HPI:      History of Present Illness  Leeanna Sofia is a 49 year old female who presents for follow-up.    She has lost four pounds since her last visit and is pleased with this progress, hoping it will improve her overall health and eczema. Her eczema remains a significant concern as it is not improving. She has tried Dupixent but found it too expensive to continue and is prioritizing weight loss over eczema treatment. Allergy testing has not been pursued due to logistical challenges and other health priorities.    She recently received an iron infusion with no side effects and anticipates improved energy levels in the coming weeks. She takes Zoloft 100 mg daily, now in the morning, and notes a positive impact on her mental health.       Allergies:  Allergies[1]   Current Meds:  Current Medications[2]     PMH:   Past Medical History[3]    ROS:    GENERAL: Negative for fever, chills and fatigue. NAD.  HENT: Negative for congestion, sore throat, and ear pain.  RESPIRATORY: Negative for cough, chest tightness, shortness of breath and wheezing.    CV: Negative for chest pain, palpitations and leg swelling.   GI: Negative for nausea, vomiting, abdominal pain, diarrhea, and blood in stool.   : Negative for dysuria, hematuria and difficulty urinating.   MUSCULOSKELETAL: Negative for myalgias, back pain, joint swelling, arthralgias and gait problem.   NEURO: Negative for dizziness, syncope, weakness, numbness, tingling and headaches.   PSYCH: The patient is not nervous/anxious. No depression.      PHYSICAL EXAM:    /80   Pulse 84   Temp 97.6 °F (36.4 °C) (Temporal)   Resp 18   Ht 5' 7\" (1.702 m)   Wt 265 lb 12.8 oz (120.6 kg)   LMP 06/01/2025 (Exact Date)   SpO2 98%   BMI 41.63 kg/m²     GENERAL: NAD. A&Ox3  RESPIRATORY: CTAB, no R/R/W  CV:  RRR, no murmurs.   PSYCH: Appropriate mood and affect.      ASSESSMENT/ PLAN:   1. Controlled type 2 diabetes mellitus without complication, without long-term current use of insulin (HCC)  Last A1c value was 5.4% done 3/21/2025.  Controlled with metformin and mounjaro   Due for eye exam -she is aware and will schedule  - Comp Metabolic Panel (14) [E]; Future  - Hemoglobin A1C [E]; Future    2. Iron deficiency  Just completed iron infusion  Will repeat labs in 8/2025    3. Morbid obesity due to excess calories (HCC)  Working with weight loss clinic   Naltrexone added recently     4. Anxiety and depression  Better since starting zoloft 100 mg daily more consistently   Continue zoloft and wellbutrin     5. Hypothyroidism, unspecified type  Due for labs 8/2025  - TSH W Reflex To Free T4 [E]; Future    6. Eczema, unspecified type  Per derm   May consider allergy testing at some point - would refer to Dr Berumen    7. Hyperlipidemia, unspecified hyperlipidemia type  Due for labs 8/2025  - Lipid Panel; Future    8. Brain fog  Likely multifactorial - iron def, anxiety, busy lifestyle, etc  Check B12 with next labs   Consider ADHD testing in the future   - Vitamin B12 [E]; Future       Health Maintenance Due   Topic Date Due    Diabetes Care Dilated Eye Exam  03/30/2025       Assessment & Plan  Eczema  Chronic eczema persists despite Dupixent. She prioritizes weight loss over Dupixent due to cost. Potential indirect benefit from weight loss on symptoms.  - Defer allergy testing.  - Consider future referral to allergist if needed.    Iron deficiency anemia  Recent iron infusion administered without side effects. Expected improvement in energy levels over 4-6 weeks.    Depression  Managed with Zoloft 100 mg daily. Reports improvement with consistent morning dosing. Anticipated further improvement with increased energy from iron supplementation.    Type 2 diabetes mellitus without complications  Well-controlled with A1c of 5.4%.  No immediate need for re-evaluation until September.  - Recheck A1c in September.  - Coordinate lab work with Dr. Cisneros's schedule in August.    Memory concerns  Memory concerns possibly related to menopause, iron deficiency, and potential undiagnosed ADD. Metformin may contribute to decreased B12 levels affecting memory.  - Check B12 levels during August lab work.  - Consider ADD testing if memory concerns persist after addressing other factors.          Pt indicates understanding and agrees to the plan.     No follow-ups on file.    Evangelina Lagos PA-C         The following individual(s) verbally consented to be recorded using ambient AI listening technology and understand that they can each withdraw their consent to this listening technology at any point by asking the clinician to turn off or pause the recording:    Patient name: Leeanna Sofia  Additional names:  n/a                 [1] No Known Allergies  [2]   Current Outpatient Medications   Medication Sig Dispense Refill    metFORMIN  MG Oral Tablet 24 Hr Take 2 tablets (1,500 mg total) by mouth daily with breakfast. 180 tablet 3    levothyroxine 88 MCG Oral Tab Take 1 tablet (88 mcg total) by mouth daily. 90 tablet 3    Tirzepatide (MOUNJARO) 12.5 MG/0.5ML Subcutaneous Solution Auto-injector Inject 12.5 mg into the skin once a week. 6 mL 1    naltrexone 50 MG Oral Tab Take 0.5 tablets (25 mg total) by mouth daily. 30 tablet 0    naproxen 500 MG Oral Tab Take 1 tablet (500 mg total) by mouth 2 (two) times daily with meals. 30 tablet 1    lisinopril 20 MG Oral Tab Take 1 tablet (20 mg total) by mouth daily. 90 tablet 3    buPROPion  MG Oral Tablet 24 Hr Take 1 tablet (300 mg total) by mouth daily. 90 tablet 1    albuterol 108 (90 Base) MCG/ACT Inhalation Aero Soln Inhale 1 puff into the lungs every 6 (six) hours as needed. 8.5 each 0    triamcinolone 0.1 % External Cream Apply topically 2 (two) times daily as needed. 60 g 1    Blood  Glucose Monitoring Suppl (ACCU-CHEK GUIDE) w/Device Does not apply Kit 1 lancet by Finger stick route 2 (two) times daily.      Blood Glucose Monitoring Suppl (PRECISION XTRA) Does not apply Device 1 Application 2 (two) times daily.      Glucose Blood (ACCU-CHEK GUIDE) In Vitro Strip 1 STRIP BY [facility] (NON DRUG COMBO ROUTE) ROUTE DAILY.      Sertraline HCl 100 MG Oral Tab TAKE 1 TABLET BY MOUTH EVERY DAY      ATORVASTATIN 10 MG Oral Tab TAKE 1 TABLET BY MOUTH EVERY DAY AT NIGHT 90 tablet 1   [3]   Past Medical History:   SHAUN (obstructive sleep apnea)    AHI 15.1 Supine AHI 15 non-supine AHI 47.1

## 2025-06-05 PROBLEM — E66.01 MORBID (SEVERE) OBESITY DUE TO EXCESS CALORIES (HCC): Status: ACTIVE | Noted: 2025-06-05

## 2025-06-17 PROBLEM — F43.81 PROLONGED GRIEF DISORDER: Status: ACTIVE | Noted: 2025-06-17

## 2025-06-19 PROBLEM — F43.9 STRESS: Status: ACTIVE | Noted: 2025-06-19

## 2025-06-20 ENCOUNTER — OFFICE VISIT (OUTPATIENT)
Dept: INTERNAL MEDICINE CLINIC | Facility: CLINIC | Age: 50
End: 2025-06-20
Payer: COMMERCIAL

## 2025-06-20 VITALS
OXYGEN SATURATION: 98 % | WEIGHT: 260 LBS | HEIGHT: 67 IN | RESPIRATION RATE: 18 BRPM | DIASTOLIC BLOOD PRESSURE: 82 MMHG | BODY MASS INDEX: 40.81 KG/M2 | SYSTOLIC BLOOD PRESSURE: 110 MMHG | HEART RATE: 70 BPM

## 2025-06-20 DIAGNOSIS — E66.01 MORBID OBESITY DUE TO EXCESS CALORIES (HCC): ICD-10-CM

## 2025-06-20 DIAGNOSIS — E11.9 CONTROLLED TYPE 2 DIABETES MELLITUS WITHOUT COMPLICATION, WITHOUT LONG-TERM CURRENT USE OF INSULIN (HCC): ICD-10-CM

## 2025-06-20 DIAGNOSIS — Z51.81 THERAPEUTIC DRUG MONITORING: ICD-10-CM

## 2025-06-20 DIAGNOSIS — G47.33 OSA (OBSTRUCTIVE SLEEP APNEA): Primary | ICD-10-CM

## 2025-06-20 DIAGNOSIS — I10 PRIMARY HYPERTENSION: ICD-10-CM

## 2025-06-20 DIAGNOSIS — D50.8 OTHER IRON DEFICIENCY ANEMIA: ICD-10-CM

## 2025-06-20 PROBLEM — D64.9 ABSOLUTE ANEMIA: Status: ACTIVE | Noted: 2025-06-20

## 2025-06-20 PROCEDURE — 98006 SYNCH AUDIO-VIDEO EST MOD 30: CPT | Performed by: NURSE PRACTITIONER

## 2025-06-20 NOTE — PATIENT INSTRUCTIONS
Books/Video Education/Podcasts  Mindless Eating by Chapito Kim - has a free YouTube channel but also specifies education for perimenopause and menopause women  Why We Get Sick by Tavo Mullins (a book about insulin resistance)  Free YouTube channel  Atomic Habits by Jay Gunderson (a book about taking small steps to promote greater behavior change)   Can't Hurt Me by Curtis Scott (a book exploring the power of discipline in achieving your goals)  Your Body in Balance: The New Science of Food, Hormones, and Health by Dr. John Segovia  The Menopause Diet Plan by Susan Clayton and Judy Emmanuel  The Complete Guide to fasting by Dr. Henson  Sugar, Salt & Fat by Anne Marie Almonte, Ph.D, R.D.  Weight Loss Surgery Will Not Treat Food Addiction by Octavia Friedman Ph.D  The Game Changers- Hiph[employer]ters Documentary on plant based nutrition  Fed Up - documentary about obesity (Free on Utube)  The Truth About Sugar - documentary on sugar (Free on Utube, https://youtu.be/9J5kdvmLM9y)  The Dr. Oakley T5 Wellness Plan by Dr. Pratik Oakley MD  Fitlosophy Fitspiration - journal to better health (found at [employer] in fitness aisle)  What Happened to You?- a look at the impact trauma has on behavior written by Cas Pollard and Dr. Nacho Ellis  Whole Again by Adelfo Santillan - discovering your true self after trauma  Edgar Jones talk on Hiphunters, The Call to Courage  Podcasts: The Exam Room by the Physician's Committee, Nutrition Facts by Dr. Madhu Cintron and Landmines by Osman Jones - A diabetes guide  Why Zebras don't get ulcers - book on the effects of chronic stress!    We are here to support you with weight loss, but please remember that you still need your primary care provider for your routine health maintenance.

## 2025-06-20 NOTE — PROGRESS NOTES
HISTORY OF PRESENT ILLNESS  Chief Complaint   Patient presents with    Weight Check     Down 10#     Leeanna Sofia is a 49 year old female here for follow up in medical weight loss program.     Pt states she recently switched her care to [place], coming from [facility].  Pt states she took 6-8 months off in between and felt like she eneded a reset.  Pt states she has been on Mounjaro 12.5mg for a long time now.  Pt states she has been on Bupropion but recently increased it to 300mg and added Naltrexone 50mg.  Pt states she really likes her regimen thus far.  Pt states she is pleased with the weight loss.    Hx.   330lbs - started on Ozempic, then switch to mounjaro   Mounjaro 12.5mg - on this dose over 1 year    [facility] Follow Up    General Information  Success Moment: Logged food  Challenging Moment: Still need to prepare better  Nutrition Recall  Breakfast: 2 eggs and 1 c strawberries Lunch: Bare chicken nuggets 8   Dinner: 3 chicken wings and carrots Snacks: Pretzel, cheese   Fluids: Sparkling water, coffee, diet come Dining Out: 4   Exercise   Patient stated exercises # days/week: 4  Patient stated perceived level of   exertion: 3 Anaerobic Days: 2   Aerobic Days: 4   Patient stated average level of stress: 7  Sleep   Patient stated # hours uninterrupted sleep: 5   Patient stated feels   restful: No      Cause of disruption of sleep: Stress caffeine and bladder   Goals: Continued progress better choices                Wt Readings from Last 6 Encounters:   06/20/25 260 lb (117.9 kg)   06/04/25 265 lb 12.8 oz (120.6 kg)   05/29/25 269 lb 9.6 oz (122.3 kg)   05/19/25 268 lb 12.8 oz (121.9 kg)   05/19/25 268 lb 12.8 oz (121.9 kg)   05/13/25 270 lb (122.5 kg)     Breakfast Lunch Dinner Snacks Fluids   Reviewed         REVIEW OF SYSTEMS  GENERAL HEALTH: feels well otherwise, denied any fevers chills or night sweats   RESPIRATORY: denies shortness of breath   CARDIOVASCULAR: denies chest pain  GI: denies  abdominal pain    EXAM  /82   Pulse 70   Resp 18   Ht 5' 7\" (1.702 m)   Wt 260 lb (117.9 kg)   LMP 06/01/2025 (Exact Date)   SpO2 98%   BMI 40.72 kg/m²   GENERAL: well developed, well nourished,in no apparent distress, A/O x3  SKIN: no rashes,no suspicious lesions  HEENT: atraumatic, normocephalic, OP-clear, PERRL  NECK: supple,no adenopathy  LUNGS: clear to auscultation bilaterally   CARDIO: RRR without murmur  GI: good BS's,NT/ND, no masses or HSM  EXTREMITIES: no cyanosis, no clubbing, no edema    Lab Results   Component Value Date    WBC 7.6 05/03/2025    RBC 5.50 (H) 05/03/2025    HGB 14.1 05/03/2025    HCT 45.2 05/03/2025    MCV 82.2 05/03/2025    MCH 25.6 (L) 05/03/2025    MCHC 31.2 05/03/2025    RDW 14.6 05/03/2025    .0 05/03/2025     Lab Results   Component Value Date    GLU 90 08/31/2024    BUN 11 08/31/2024    BUNCREA 16.9 08/31/2024    CREATSERUM 0.65 08/31/2024    ANIONGAP 7 08/31/2024    GFRNAA 102 05/21/2022    GFRAA 118 05/21/2022    CA 9.1 08/31/2024    OSMOCALC 297 (H) 08/31/2024    ALKPHO 84 08/31/2024    AST 14 08/31/2024    ALT 12 08/31/2024    BILT 0.3 08/31/2024    TP 7.1 08/31/2024    ALB 4.0 08/31/2024    GLOBULIN 3.1 08/31/2024     08/31/2024    K 4.3 08/31/2024     08/31/2024    CO2 28.0 08/31/2024     Lab Results   Component Value Date     03/21/2025    A1C 5.4 03/21/2025     Lab Results   Component Value Date    CHOLEST 166 08/31/2024    TRIG 104 08/31/2024    HDL 64 (H) 08/31/2024    LDL 83 08/31/2024    VLDL 16 08/31/2024    NONHDLC 102 08/31/2024     Lab Results   Component Value Date    TSH 2.020 03/21/2025     No results found for: \"B12\", \"VITB12\"  Lab Results   Component Value Date    VITD 75.94 09/13/2021       Medications Ordered Prior to Encounter[1]    ASSESSMENT  Analyzed weight data:     [facility] Information Bariatric Seminar Date: 5/13/25 Patient type:   Lifestyle   Initial Body Fat %: 47.2      Date of Initial Weight: 05/13/2025  Initial Weight: 270 Today's Weight: 260   Weightloss to Date: 10   Weightloss Percentage: 3.7 5% Goal: 13.5 10% Goal: 27    Initial BMI: 42.1 Today's BMI: 40.72 Change in BMI: 1.38       Does/Did the patient have  any of the following co-morbidities present on   the initial visit?: DM 2, Hypertension, Sleep Apnea   Have any comorbidities improved since the last visit?   Hypertension DM 2 Dyslipidemia Osteoarthritis Sleep Apnea   Yes Yes   Yes               Any side complications or side effects from obesity medicine treatment?:   No            Diagnoses and all orders for this visit:    SHAUN (obstructive sleep apnea)  -     naltrexone 50 MG Oral Tab; Take 1 tablet (50 mg total) by mouth daily.    Therapeutic drug monitoring  -     naltrexone 50 MG Oral Tab; Take 1 tablet (50 mg total) by mouth daily.    Primary hypertension [I10]  -     naltrexone 50 MG Oral Tab; Take 1 tablet (50 mg total) by mouth daily.    Other iron deficiency anemia [D50.8]  -     naltrexone 50 MG Oral Tab; Take 1 tablet (50 mg total) by mouth daily.    Morbid obesity due to excess calories (HCC) [E66.01]  -     naltrexone 50 MG Oral Tab; Take 1 tablet (50 mg total) by mouth daily.    Controlled type 2 diabetes mellitus without complication, without long-term current use of insulin (HCC) [E11.9]  -     naltrexone 50 MG Oral Tab; Take 1 tablet (50 mg total) by mouth daily.      PLAN  BMI 53.2, 331 lb at start of treatment 5/2021.   Assessment & Plan  Obesity  - pt to continue Mounjaro 12.5mg weekly injections for now  - pt continue to take Naltrexone 50mg and Bupropion 300mg ER tablets.  - pt continuing to work on diet and lifestyle     Sleep apnea  - continues: Sleep apnea with no CPAP use due to insurance issues. Discussed weight loss as primary treatment strategy.    Iron deficiency anemia  - Proceed with scheduled hematology appointment for further evaluation and management of iron deficiency anemia.    HTN-stable controlled    Patient  Instructions   Books/Video Education/Podcasts  Mindless Eating by Chapito Kim - has a free YouTube channel but also specifies education for perimenopause and menopause women  Why We Get Sick by Tavo Mullins (a book about insulin resistance)  Free YouTube channel  Atomic Habits by Jay Gunderson (a book about taking small steps to promote greater behavior change)   Can't Hurt Me by Curtis Scott (a book exploring the power of discipline in achieving your goals)  Your Body in Balance: The New Science of Food, Hormones, and Health by Dr. John Segovia  The Menopause Diet Plan by Susan Clayton and Judy Emmanuel  The Complete Guide to fasting by Dr. Henson  Sugar, Salt & Fat by Anne Marie Almonte, Ph.D, R.D.  Weight Loss Surgery Will Not Treat Food Addiction by Octavia Friedman Ph.D  The Game Changers- [place] Documentary on plant based nutrition  Fed Up - documentary about obesity (Free on Utube)  The Truth About Sugar - documentary on sugar (Free on [place], https://youtu.be/0S7cmlbSM2t)  The Dr. Oakley T5 Wellness Plan by Dr. Pratik Oakley MD  Fitlosophy Fitspiration - journal to better health (found at [employer] in fitness aisle)  What Happened to You?- a look at the impact trauma has on behavior written by Cas Pollard and Dr. Nacho Ellis  Whole Again by Adelfo Santillan - discovering your true self after trauma  Edgar Jones talk on [place], The Call to Courage  Podcasts: The Exam Room by the Physician's Committee, Nutrition Facts by Dr. Madhu Cintron and Landmines by Osman Jones - A diabetes guide  Why Zebras don't get ulcers - book on the effects of chronic stress!    We are here to support you with weight loss, but please remember that you still need your primary care provider for your routine health maintenance.      No follow-ups on file.    Patient verbalizes understanding.    Melita Borrero, ARNOLDO, FNP-BC     Answers submitted by the patient for this visit:  Medical Weight Loss Follow Up  (Submitted on 6/14/2025)  If greater than 5/1O how would you grade your coping mechanisms?: moderate         [1]   Current Outpatient Medications on File Prior to Visit   Medication Sig Dispense Refill    metFORMIN  MG Oral Tablet 24 Hr Take 2 tablets (1,500 mg total) by mouth daily with breakfast. 180 tablet 3    levothyroxine 88 MCG Oral Tab Take 1 tablet (88 mcg total) by mouth daily. 90 tablet 3    Tirzepatide (MOUNJARO) 12.5 MG/0.5ML Subcutaneous Solution Auto-injector Inject 12.5 mg into the skin once a week. 6 mL 1    naproxen 500 MG Oral Tab Take 1 tablet (500 mg total) by mouth 2 (two) times daily with meals. 30 tablet 1    lisinopril 20 MG Oral Tab Take 1 tablet (20 mg total) by mouth daily. 90 tablet 3    buPROPion  MG Oral Tablet 24 Hr Take 1 tablet (300 mg total) by mouth daily. 90 tablet 1    albuterol 108 (90 Base) MCG/ACT Inhalation Aero Soln Inhale 1 puff into the lungs every 6 (six) hours as needed. 8.5 each 0    triamcinolone 0.1 % External Cream Apply topically 2 (two) times daily as needed. 60 g 1    Blood Glucose Monitoring Suppl (ACCU-CHEK GUIDE) w/Device Does not apply Kit 1 lancet by Finger stick route 2 (two) times daily.      Blood Glucose Monitoring Suppl (PRECISION XTRA) Does not apply Device 1 Application 2 (two) times daily.      Glucose Blood (ACCU-CHEK GUIDE) In Vitro Strip 1 STRIP BY [facility]. (NON DRUG COMBO ROUTE) ROUTE DAILY.      Sertraline HCl 100 MG Oral Tab TAKE 1 TABLET BY MOUTH EVERY DAY       No current facility-administered medications on file prior to visit.

## 2025-06-22 RX ORDER — NALTREXONE HYDROCHLORIDE 50 MG/1
50 TABLET, FILM COATED ORAL DAILY
Qty: 30 TABLET | Refills: 3 | Status: SHIPPED | OUTPATIENT
Start: 2025-06-22

## 2025-07-18 ENCOUNTER — OFFICE VISIT (OUTPATIENT)
Dept: INTERNAL MEDICINE CLINIC | Facility: CLINIC | Age: 50
End: 2025-07-18
Payer: COMMERCIAL

## 2025-07-18 VITALS
SYSTOLIC BLOOD PRESSURE: 110 MMHG | HEIGHT: 67 IN | HEART RATE: 82 BPM | DIASTOLIC BLOOD PRESSURE: 80 MMHG | WEIGHT: 258 LBS | BODY MASS INDEX: 40.49 KG/M2 | RESPIRATION RATE: 18 BRPM

## 2025-07-18 DIAGNOSIS — I10 PRIMARY HYPERTENSION: ICD-10-CM

## 2025-07-18 DIAGNOSIS — G47.33 OSA (OBSTRUCTIVE SLEEP APNEA): ICD-10-CM

## 2025-07-18 DIAGNOSIS — E11.9 CONTROLLED TYPE 2 DIABETES MELLITUS WITHOUT COMPLICATION, WITHOUT LONG-TERM CURRENT USE OF INSULIN (HCC): Primary | ICD-10-CM

## 2025-07-18 DIAGNOSIS — D50.8 OTHER IRON DEFICIENCY ANEMIA: ICD-10-CM

## 2025-07-18 DIAGNOSIS — Z51.81 THERAPEUTIC DRUG MONITORING: ICD-10-CM

## 2025-07-18 DIAGNOSIS — E66.01 MORBID OBESITY DUE TO EXCESS CALORIES (HCC): ICD-10-CM

## 2025-07-18 PROCEDURE — 99214 OFFICE O/P EST MOD 30 MIN: CPT | Performed by: NURSE PRACTITIONER

## 2025-07-18 RX ORDER — TIRZEPATIDE 12.5 MG/.5ML
12.5 INJECTION, SOLUTION SUBCUTANEOUS WEEKLY
Qty: 6 ML | Refills: 1 | Status: SHIPPED | OUTPATIENT
Start: 2025-07-18

## 2025-07-18 NOTE — PROGRESS NOTES
HISTORY OF PRESENT ILLNESS  Chief Complaint   Patient presents with    Weight Check     Down 2#     Leeanna Sofia is a 49 year old female here for follow up in medical weight loss program.     Pt states she has been on Mounjaro 12.5mg for a long time now.  Pt states she has been on Bupropion 300mg and Naltrexone 50mg.  Pt states she is compliant with her regimen.    Hx.   330lbs - started on Ozempic, then switch to mounjaro   Mounjaro 12.5mg - on this dose over 1 year    Toughest challenge: When pt is busy and not able to plan meals ahead of time  Successfully Exercising    Breakfast: Cup of coffee - 2tbps creamer - try to do sugar free  bottle of water  Lunch: bowl, protein and veggies, rice   Chicken fajita, salsa, cheese, sour cream  Snacks: not really, sometimes nuts  Dinner: grilled burgers, patties and     Planned breakfast: hard boiled egg, cup strawberries, greek yogurt  Premier protein shake with double espresso  Lunch at work: [employer]     [facility] Follow Up    General Information  Nutrition Recall  Exercise     Sleep                Wt Readings from Last 6 Encounters:   07/18/25 258 lb (117 kg)   06/20/25 260 lb (117.9 kg)   06/04/25 265 lb 12.8 oz (120.6 kg)   05/29/25 269 lb 9.6 oz (122.3 kg)   05/19/25 268 lb 12.8 oz (121.9 kg)   05/19/25 268 lb 12.8 oz (121.9 kg)     Breakfast Lunch Dinner Snacks Fluids   Reviewed         REVIEW OF SYSTEMS  GENERAL HEALTH: feels well otherwise, denied any fevers chills or night sweats   RESPIRATORY: denies shortness of breath   CARDIOVASCULAR: denies chest pain  GI: denies abdominal pain    EXAM  /80   Pulse 82   Resp 18   Ht 5' 7\" (1.702 m)   Wt 258 lb (117 kg)   LMP 06/01/2025 (Exact Date)   BMI 40.41 kg/m²   GENERAL: well developed, well nourished,in no apparent distress, A/O x3  SKIN: no rashes,no suspicious lesions  HEENT: atraumatic, normocephalic, OP-clear, PERRL  NECK: supple,no adenopathy  LUNGS: clear to auscultation bilaterally   CARDIO: RRR  without murmur  GI: good BS's,NT/ND, no masses or HSM  EXTREMITIES: no cyanosis, no clubbing, no edema    Lab Results   Component Value Date    WBC 7.6 05/03/2025    RBC 5.50 (H) 05/03/2025    HGB 14.1 05/03/2025    HCT 45.2 05/03/2025    MCV 82.2 05/03/2025    MCH 25.6 (L) 05/03/2025    MCHC 31.2 05/03/2025    RDW 14.6 05/03/2025    .0 05/03/2025     Lab Results   Component Value Date    GLU 90 08/31/2024    BUN 11 08/31/2024    BUNCREA 16.9 08/31/2024    CREATSERUM 0.65 08/31/2024    ANIONGAP 7 08/31/2024    GFRNAA 102 05/21/2022    GFRAA 118 05/21/2022    CA 9.1 08/31/2024    OSMOCALC 297 (H) 08/31/2024    ALKPHO 84 08/31/2024    AST 14 08/31/2024    ALT 12 08/31/2024    BILT 0.3 08/31/2024    TP 7.1 08/31/2024    ALB 4.0 08/31/2024    GLOBULIN 3.1 08/31/2024     08/31/2024    K 4.3 08/31/2024     08/31/2024    CO2 28.0 08/31/2024     Lab Results   Component Value Date     03/21/2025    A1C 5.4 03/21/2025     Lab Results   Component Value Date    CHOLEST 166 08/31/2024    TRIG 104 08/31/2024    HDL 64 (H) 08/31/2024    LDL 83 08/31/2024    VLDL 16 08/31/2024    NONHDLC 102 08/31/2024     Lab Results   Component Value Date    TSH 2.020 03/21/2025     No results found for: \"B12\", \"VITB12\"  Lab Results   Component Value Date    VITD 75.94 09/13/2021       Medications Ordered Prior to Encounter[1]    ASSESSMENT  Analyzed weight data:     [facility] Information Bariatric Seminar Date: 5/13/25 Patient type:   Lifestyle   Initial Body Fat %: 47.2      Date of Initial Weight: 05/13/2025 Initial Weight: 270 Today's Weight: 258   Weightloss to Date: 12   Weightloss Percentage: 4.44 5% Goal: 13.5 10% Goal: 27    Initial BMI: 42.1 Today's BMI: 40.41 Change in BMI: 1.69       Does/Did the patient have  any of the following co-morbidities present on   the initial visit?: DM 2, Hypertension, Sleep Apnea   Have any comorbidities improved since the last visit?   Hypertension DM 2 Dyslipidemia Osteoarthritis  Sleep Apnea   Yes Yes   Yes               Any side complications or side effects from obesity medicine treatment?:   No            Diagnoses and all orders for this visit:    Controlled type 2 diabetes mellitus without complication, without long-term current use of insulin (HCC) [E11.9]  -     Tirzepatide (MOUNJARO) 12.5 MG/0.5ML Subcutaneous Solution Auto-injector; Inject 12.5 mg into the skin once a week.    Morbid obesity due to excess calories (HCC) [E66.01]    Other iron deficiency anemia [D50.8]    SHAUN (obstructive sleep apnea)    Therapeutic drug monitoring    Primary hypertension [I10]      PLAN  BMI 53.2, 331 lb at start of treatment 5/2021.   Assessment & Plan  Obesity  - pt to continue Mounjaro 12.5mg weekly injections  - pt continue to take Naltrexone 50mg and Bupropion 300mg ER tablets.  - pt continuing to work on diet and lifestyle   - continuing walks, workouts    Type 2 Diabetes  - Mounjaro 12.5mg  - continue Metformin 750mg ER    Sleep apnea  - continues: Sleep apnea with no CPAP use due to insurance issues.     Iron deficiency anemia  - sees HEMATOLOGY     HTN  - stable controlled    There are no Patient Instructions on file for this visit.    No follow-ups on file.    Patient verbalizes understanding.    Melita Borrero, DNP, FNP-BC          [1]   Current Outpatient Medications on File Prior to Visit   Medication Sig Dispense Refill    naltrexone 50 MG Oral Tab Take 1 tablet (50 mg total) by mouth daily. 30 tablet 3    metFORMIN  MG Oral Tablet 24 Hr Take 2 tablets (1,500 mg total) by mouth daily with breakfast. 180 tablet 3    levothyroxine 88 MCG Oral Tab Take 1 tablet (88 mcg total) by mouth daily. 90 tablet 3    naproxen 500 MG Oral Tab Take 1 tablet (500 mg total) by mouth 2 (two) times daily with meals. 30 tablet 1    lisinopril 20 MG Oral Tab Take 1 tablet (20 mg total) by mouth daily. 90 tablet 3    buPROPion  MG Oral Tablet 24 Hr Take 1 tablet (300 mg total) by mouth daily.  90 tablet 1    albuterol 108 (90 Base) MCG/ACT Inhalation Aero Soln Inhale 1 puff into the lungs every 6 (six) hours as needed. 8.5 each 0    triamcinolone 0.1 % External Cream Apply topically 2 (two) times daily as needed. 60 g 1    Blood Glucose Monitoring Suppl (ACCU-CHEK GUIDE) w/Device Does not apply Kit 1 lancet by Finger stick route 2 (two) times daily.      Blood Glucose Monitoring Suppl (PRECISION XTRA) Does not apply Device 1 Application 2 (two) times daily.      Glucose Blood (ACCU-CHEK GUIDE) In Vitro Strip 1 STRIP BY [facility]. (NON DRUG COMBO ROUTE) ROUTE DAILY.      Sertraline HCl 100 MG Oral Tab TAKE 1 TABLET BY MOUTH EVERY DAY       No current facility-administered medications on file prior to visit.

## 2025-07-21 ENCOUNTER — TELEPHONE (OUTPATIENT)
Dept: INTERNAL MEDICINE CLINIC | Facility: CLINIC | Age: 50
End: 2025-07-21

## 2025-07-21 RX ORDER — BUPROPION HYDROCHLORIDE 300 MG/1
300 TABLET ORAL DAILY
Qty: 90 TABLET | Refills: 3 | Status: SHIPPED | OUTPATIENT
Start: 2025-07-21

## 2025-07-28 ENCOUNTER — APPOINTMENT (OUTPATIENT)
Dept: CT IMAGING | Facility: HOSPITAL | Age: 50
End: 2025-07-28
Attending: STUDENT IN AN ORGANIZED HEALTH CARE EDUCATION/TRAINING PROGRAM

## 2025-07-28 ENCOUNTER — HOSPITAL ENCOUNTER (EMERGENCY)
Facility: HOSPITAL | Age: 50
Discharge: HOME | End: 2025-07-28
Attending: STUDENT IN AN ORGANIZED HEALTH CARE EDUCATION/TRAINING PROGRAM

## 2025-07-28 VITALS
RESPIRATION RATE: 18 BRPM | TEMPERATURE: 99 F | SYSTOLIC BLOOD PRESSURE: 142 MMHG | OXYGEN SATURATION: 94 % | HEART RATE: 105 BPM | DIASTOLIC BLOOD PRESSURE: 85 MMHG

## 2025-07-28 DIAGNOSIS — R51.9 ACUTE NONINTRACTABLE HEADACHE, UNSPECIFIED HEADACHE TYPE: Primary | ICD-10-CM

## 2025-07-28 LAB
ALBUMIN SERPL-MCNC: 4.8 G/DL (ref 3.2–4.8)
ALBUMIN/GLOB SERPL: 1.6 (ref 1–2)
ALP LIVER SERPL-CCNC: 93 U/L (ref 39–100)
ALT SERPL-CCNC: 19 U/L (ref 10–49)
ANION GAP SERPL CALC-SCNC: 8 MMOL/L (ref 0–18)
AST SERPL-CCNC: 15 U/L (ref ?–34)
BASOPHILS # BLD AUTO: 0.07 X10(3) UL (ref 0–0.2)
BASOPHILS NFR BLD AUTO: 0.7 %
BILIRUB SERPL-MCNC: 0.5 MG/DL (ref 0.3–1.2)
BUN BLD-MCNC: 10 MG/DL (ref 9–23)
BUN/CREAT SERPL: 13.2 (ref 10–20)
CALCIUM BLD-MCNC: 9.2 MG/DL (ref 8.7–10.4)
CHLORIDE SERPL-SCNC: 103 MMOL/L (ref 98–112)
CO2 SERPL-SCNC: 25 MMOL/L (ref 21–32)
CREAT BLD-MCNC: 0.76 MG/DL (ref 0.55–1.02)
DEPRECATED RDW RBC AUTO: 45.6 FL (ref 35.1–46.3)
EGFRCR SERPLBLD CKD-EPI 2021: 96 ML/MIN/1.73M2 (ref 60–?)
EOSINOPHIL # BLD AUTO: 0.02 X10(3) UL (ref 0–0.7)
EOSINOPHIL NFR BLD AUTO: 0.2 %
ERYTHROCYTE [DISTWIDTH] IN BLOOD BY AUTOMATED COUNT: 15.7 % (ref 11–15)
GLOBULIN PLAS-MCNC: 3 G/DL (ref 2–3.5)
GLUCOSE BLD-MCNC: 117 MG/DL (ref 70–99)
GLUCOSE BLDC GLUCOMTR-MCNC: 108 MG/DL (ref 70–99)
HCT VFR BLD AUTO: 42.4 % (ref 35–48)
HGB BLD-MCNC: 14.3 G/DL (ref 12–16)
IMM GRANULOCYTES # BLD AUTO: 0.05 X10(3) UL (ref 0–1)
IMM GRANULOCYTES NFR BLD: 0.5 %
LIPASE SERPL-CCNC: 24 U/L (ref 12–53)
LYMPHOCYTES # BLD AUTO: 1.59 X10(3) UL (ref 1–4)
LYMPHOCYTES NFR BLD AUTO: 15.6 %
MCH RBC QN AUTO: 27.2 PG (ref 26–34)
MCHC RBC AUTO-ENTMCNC: 33.7 G/DL (ref 31–37)
MCV RBC AUTO: 80.6 FL (ref 80–100)
MONOCYTES # BLD AUTO: 0.42 X10(3) UL (ref 0.1–1)
MONOCYTES NFR BLD AUTO: 4.1 %
NEUTROPHILS # BLD AUTO: 8.01 X10 (3) UL (ref 1.5–7.7)
NEUTROPHILS # BLD AUTO: 8.01 X10(3) UL (ref 1.5–7.7)
NEUTROPHILS NFR BLD AUTO: 78.9 %
OSMOLALITY SERPL CALC.SUM OF ELEC: 282 MOSM/KG (ref 275–295)
PLATELET # BLD AUTO: 446 10(3)UL (ref 150–450)
POTASSIUM SERPL-SCNC: 4.2 MMOL/L (ref 3.5–5.1)
PROT SERPL-MCNC: 7.8 G/DL (ref 5.7–8.2)
RBC # BLD AUTO: 5.26 X10(6)UL (ref 3.8–5.3)
SODIUM SERPL-SCNC: 136 MMOL/L (ref 136–145)
WBC # BLD AUTO: 10.2 X10(3) UL (ref 4–11)

## 2025-07-28 PROCEDURE — 70498 CT ANGIOGRAPHY NECK: CPT | Performed by: STUDENT IN AN ORGANIZED HEALTH CARE EDUCATION/TRAINING PROGRAM

## 2025-07-28 PROCEDURE — 80053 COMPREHEN METABOLIC PANEL: CPT | Performed by: STUDENT IN AN ORGANIZED HEALTH CARE EDUCATION/TRAINING PROGRAM

## 2025-07-28 PROCEDURE — 96366 THER/PROPH/DIAG IV INF ADDON: CPT

## 2025-07-28 PROCEDURE — 80053 COMPREHEN METABOLIC PANEL: CPT

## 2025-07-28 PROCEDURE — 83690 ASSAY OF LIPASE: CPT

## 2025-07-28 PROCEDURE — 96361 HYDRATE IV INFUSION ADD-ON: CPT

## 2025-07-28 PROCEDURE — 85025 COMPLETE CBC W/AUTO DIFF WBC: CPT | Performed by: STUDENT IN AN ORGANIZED HEALTH CARE EDUCATION/TRAINING PROGRAM

## 2025-07-28 PROCEDURE — 82962 GLUCOSE BLOOD TEST: CPT

## 2025-07-28 PROCEDURE — 99285 EMERGENCY DEPT VISIT HI MDM: CPT

## 2025-07-28 PROCEDURE — 70496 CT ANGIOGRAPHY HEAD: CPT | Performed by: STUDENT IN AN ORGANIZED HEALTH CARE EDUCATION/TRAINING PROGRAM

## 2025-07-28 PROCEDURE — 96375 TX/PRO/DX INJ NEW DRUG ADDON: CPT

## 2025-07-28 PROCEDURE — 96365 THER/PROPH/DIAG IV INF INIT: CPT

## 2025-07-28 PROCEDURE — 83690 ASSAY OF LIPASE: CPT | Performed by: STUDENT IN AN ORGANIZED HEALTH CARE EDUCATION/TRAINING PROGRAM

## 2025-07-28 PROCEDURE — 85025 COMPLETE CBC W/AUTO DIFF WBC: CPT

## 2025-07-28 PROCEDURE — 70450 CT HEAD/BRAIN W/O DYE: CPT | Performed by: STUDENT IN AN ORGANIZED HEALTH CARE EDUCATION/TRAINING PROGRAM

## 2025-07-28 RX ORDER — METOCLOPRAMIDE 10 MG/1
10 TABLET ORAL 3 TIMES DAILY PRN
Qty: 20 TABLET | Refills: 0 | Status: SHIPPED | OUTPATIENT
Start: 2025-07-28 | End: 2025-08-27

## 2025-07-28 RX ORDER — MAGNESIUM SULFATE HEPTAHYDRATE 40 MG/ML
2 INJECTION, SOLUTION INTRAVENOUS ONCE
Status: COMPLETED | OUTPATIENT
Start: 2025-07-28 | End: 2025-07-28

## 2025-07-28 RX ORDER — DEXAMETHASONE SODIUM PHOSPHATE 10 MG/ML
10 INJECTION, SOLUTION INTRAMUSCULAR; INTRAVENOUS ONCE
Status: COMPLETED | OUTPATIENT
Start: 2025-07-28 | End: 2025-07-28

## 2025-07-28 RX ORDER — KETOROLAC TROMETHAMINE 15 MG/ML
15 INJECTION, SOLUTION INTRAMUSCULAR; INTRAVENOUS ONCE
Status: COMPLETED | OUTPATIENT
Start: 2025-07-28 | End: 2025-07-28

## 2025-07-28 RX ORDER — ONDANSETRON 2 MG/ML
4 INJECTION INTRAMUSCULAR; INTRAVENOUS ONCE
Status: COMPLETED | OUTPATIENT
Start: 2025-07-28 | End: 2025-07-28

## 2025-07-28 RX ORDER — PROCHLORPERAZINE EDISYLATE 5 MG/ML
10 INJECTION INTRAMUSCULAR; INTRAVENOUS ONCE
Status: COMPLETED | OUTPATIENT
Start: 2025-07-28 | End: 2025-07-28

## 2025-07-28 RX ORDER — DIPHENHYDRAMINE HYDROCHLORIDE 50 MG/ML
25 INJECTION, SOLUTION INTRAMUSCULAR; INTRAVENOUS ONCE
Status: COMPLETED | OUTPATIENT
Start: 2025-07-28 | End: 2025-07-28

## 2025-07-31 ENCOUNTER — PATIENT MESSAGE (OUTPATIENT)
Dept: INTERNAL MEDICINE CLINIC | Facility: CLINIC | Age: 50
End: 2025-07-31

## 2025-08-21 ENCOUNTER — OFFICE VISIT (OUTPATIENT)
Dept: INTERNAL MEDICINE CLINIC | Facility: CLINIC | Age: 50
End: 2025-08-21

## 2025-08-21 VITALS — HEIGHT: 67 IN | WEIGHT: 260.81 LBS | BODY MASS INDEX: 40.93 KG/M2

## 2025-08-21 DIAGNOSIS — E66.01 MORBID OBESITY DUE TO EXCESS CALORIES (HCC): ICD-10-CM

## 2025-08-21 DIAGNOSIS — E11.9 CONTROLLED TYPE 2 DIABETES MELLITUS WITHOUT COMPLICATION, WITHOUT LONG-TERM CURRENT USE OF INSULIN (HCC): Primary | ICD-10-CM

## 2025-08-21 PROCEDURE — 97803 MED NUTRITION INDIV SUBSEQ: CPT

## 2025-08-27 ENCOUNTER — OFFICE VISIT (OUTPATIENT)
Dept: INTERNAL MEDICINE CLINIC | Facility: CLINIC | Age: 50
End: 2025-08-27

## 2025-08-27 VITALS
WEIGHT: 255 LBS | OXYGEN SATURATION: 96 % | DIASTOLIC BLOOD PRESSURE: 80 MMHG | BODY MASS INDEX: 40.02 KG/M2 | RESPIRATION RATE: 18 BRPM | SYSTOLIC BLOOD PRESSURE: 122 MMHG | HEIGHT: 67 IN | HEART RATE: 68 BPM

## 2025-08-27 DIAGNOSIS — Z51.81 THERAPEUTIC DRUG MONITORING: ICD-10-CM

## 2025-08-27 DIAGNOSIS — E66.01 MORBID OBESITY DUE TO EXCESS CALORIES (HCC): Primary | ICD-10-CM

## 2025-08-27 DIAGNOSIS — I10 PRIMARY HYPERTENSION: ICD-10-CM

## 2025-08-27 DIAGNOSIS — E11.9 CONTROLLED TYPE 2 DIABETES MELLITUS WITHOUT COMPLICATION, WITHOUT LONG-TERM CURRENT USE OF INSULIN (HCC): ICD-10-CM

## 2025-08-27 DIAGNOSIS — G47.33 OSA (OBSTRUCTIVE SLEEP APNEA): ICD-10-CM

## 2025-08-27 PROCEDURE — 99214 OFFICE O/P EST MOD 30 MIN: CPT | Performed by: INTERNAL MEDICINE

## 2025-08-27 RX ORDER — PHENTERMINE HYDROCHLORIDE 15 MG/1
15 CAPSULE ORAL EVERY MORNING
Qty: 30 CAPSULE | Refills: 2 | Status: SHIPPED | OUTPATIENT
Start: 2025-08-27

## 2025-08-31 ENCOUNTER — LAB ENCOUNTER (OUTPATIENT)
Dept: LAB | Facility: REFERENCE LAB | Age: 50
End: 2025-08-31
Attending: PHYSICIAN ASSISTANT

## 2025-08-31 DIAGNOSIS — E61.1 IRON DEFICIENCY: ICD-10-CM

## 2025-08-31 DIAGNOSIS — E11.9 CONTROLLED TYPE 2 DIABETES MELLITUS WITHOUT COMPLICATION, WITHOUT LONG-TERM CURRENT USE OF INSULIN (HCC): ICD-10-CM

## 2025-08-31 DIAGNOSIS — E78.5 HYPERLIPIDEMIA, UNSPECIFIED HYPERLIPIDEMIA TYPE: ICD-10-CM

## 2025-08-31 DIAGNOSIS — R41.89 BRAIN FOG: ICD-10-CM

## 2025-08-31 DIAGNOSIS — E03.9 HYPOTHYROIDISM, UNSPECIFIED TYPE: ICD-10-CM

## 2025-08-31 LAB
ALBUMIN SERPL-MCNC: 4.5 G/DL (ref 3.2–4.8)
ALBUMIN/GLOB SERPL: 1.5 (ref 1–2)
ALP LIVER SERPL-CCNC: 88 U/L (ref 39–100)
ALT SERPL-CCNC: 19 U/L (ref 10–49)
ANION GAP SERPL CALC-SCNC: 6 MMOL/L (ref 0–18)
AST SERPL-CCNC: 14 U/L (ref ?–34)
BASOPHILS # BLD AUTO: 0.1 X10(3) UL (ref 0–0.2)
BASOPHILS NFR BLD AUTO: 1.3 %
BILIRUB SERPL-MCNC: 0.3 MG/DL (ref 0.3–1.2)
BUN BLD-MCNC: 9 MG/DL (ref 9–23)
BUN/CREAT SERPL: 12.5 (ref 10–20)
CALCIUM BLD-MCNC: 9.8 MG/DL (ref 8.7–10.4)
CHLORIDE SERPL-SCNC: 103 MMOL/L (ref 98–112)
CHOLEST SERPL-MCNC: 196 MG/DL (ref ?–200)
CO2 SERPL-SCNC: 29 MMOL/L (ref 21–32)
CREAT BLD-MCNC: 0.72 MG/DL (ref 0.55–1.02)
DEPRECATED HBV CORE AB SER IA-ACNC: 82 NG/ML (ref 50–306)
DEPRECATED RDW RBC AUTO: 45.3 FL (ref 35.1–46.3)
EGFRCR SERPLBLD CKD-EPI 2021: 102 ML/MIN/1.73M2 (ref 60–?)
EOSINOPHIL # BLD AUTO: 0.25 X10(3) UL (ref 0–0.7)
EOSINOPHIL NFR BLD AUTO: 3.2 %
ERYTHROCYTE [DISTWIDTH] IN BLOOD BY AUTOMATED COUNT: 14.4 % (ref 11–15)
EST. AVERAGE GLUCOSE BLD GHB EST-MCNC: 103 MG/DL (ref 68–126)
FASTING PATIENT LIPID ANSWER: YES
FASTING STATUS PATIENT QL REPORTED: YES
GLOBULIN PLAS-MCNC: 3.1 G/DL (ref 2–3.5)
GLUCOSE BLD-MCNC: 82 MG/DL (ref 70–99)
HBA1C MFR BLD: 5.2 % (ref ?–5.7)
HCT VFR BLD AUTO: 43 % (ref 35–48)
HDLC SERPL-MCNC: 72 MG/DL (ref 40–59)
HGB BLD-MCNC: 14 G/DL (ref 12–16)
IMM GRANULOCYTES # BLD AUTO: 0.02 X10(3) UL (ref 0–1)
IMM GRANULOCYTES NFR BLD: 0.3 %
IRON SATN MFR SERPL: 16 % (ref 15–50)
IRON SERPL-MCNC: 44 UG/DL (ref 50–170)
LDLC SERPL CALC-MCNC: 107 MG/DL (ref ?–100)
LYMPHOCYTES # BLD AUTO: 1.72 X10(3) UL (ref 1–4)
LYMPHOCYTES NFR BLD AUTO: 22.3 %
MCH RBC QN AUTO: 28.1 PG (ref 26–34)
MCHC RBC AUTO-ENTMCNC: 32.6 G/DL (ref 31–37)
MCV RBC AUTO: 86.2 FL (ref 80–100)
MONOCYTES # BLD AUTO: 0.5 X10(3) UL (ref 0.1–1)
MONOCYTES NFR BLD AUTO: 6.5 %
NEUTROPHILS # BLD AUTO: 5.12 X10 (3) UL (ref 1.5–7.7)
NEUTROPHILS # BLD AUTO: 5.12 X10(3) UL (ref 1.5–7.7)
NEUTROPHILS NFR BLD AUTO: 66.4 %
NONHDLC SERPL-MCNC: 124 MG/DL (ref ?–130)
OSMOLALITY SERPL CALC.SUM OF ELEC: 284 MOSM/KG (ref 275–295)
PLATELET # BLD AUTO: 397 10(3)UL (ref 150–450)
POTASSIUM SERPL-SCNC: 4.1 MMOL/L (ref 3.5–5.1)
PROT SERPL-MCNC: 7.6 G/DL (ref 5.7–8.2)
RBC # BLD AUTO: 4.99 X10(6)UL (ref 3.8–5.3)
SODIUM SERPL-SCNC: 138 MMOL/L (ref 136–145)
TOTAL IRON BINDING CAPACITY: 278 UG/DL (ref 250–425)
TRANSFERRIN SERPL-MCNC: 215 MG/DL (ref 250–380)
TRIGL SERPL-MCNC: 94 MG/DL (ref 30–149)
TSI SER-ACNC: 0.89 UIU/ML (ref 0.55–4.78)
VIT B12 SERPL-MCNC: 345 PG/ML (ref 211–911)
VLDLC SERPL CALC-MCNC: 16 MG/DL (ref 0–30)
WBC # BLD AUTO: 7.7 X10(3) UL (ref 4–11)

## 2025-08-31 PROCEDURE — 80061 LIPID PANEL: CPT

## 2025-08-31 PROCEDURE — 83036 HEMOGLOBIN GLYCOSYLATED A1C: CPT

## 2025-08-31 PROCEDURE — 36415 COLL VENOUS BLD VENIPUNCTURE: CPT

## 2025-08-31 PROCEDURE — 82607 VITAMIN B-12: CPT

## 2025-08-31 PROCEDURE — 84443 ASSAY THYROID STIM HORMONE: CPT

## 2025-08-31 PROCEDURE — 82728 ASSAY OF FERRITIN: CPT

## 2025-08-31 PROCEDURE — 83540 ASSAY OF IRON: CPT

## 2025-08-31 PROCEDURE — 80053 COMPREHEN METABOLIC PANEL: CPT

## 2025-08-31 PROCEDURE — 84466 ASSAY OF TRANSFERRIN: CPT

## 2025-08-31 PROCEDURE — 85025 COMPLETE CBC W/AUTO DIFF WBC: CPT

## (undated) NOTE — LETTER
AUTHORIZATION FOR SURGICAL OPERATION OR OTHER PROCEDURE    1. I hereby authorize Dr. Julio César Acharya, and [facility] staff assigned to my case to perform the following operation and/or procedure at the [facility], [facility]:    _______________________________________________________________________________________________    ENDOMETRIAL BIOPSY   _______________________________________________________________________________________________    2. My physician has explained the nature and purpose of the operation or other procedure, possible alternative methods of treatment, the risks involved, and the possibility of complication to me. I acknowledge that no guarantee has been made as to the result that may be obtained. 3.  I recognize that, during the course of this operation, or other procedure, unforseen conditions may necessitate additional or different procedure than those listed above. I, therefore, further authorize and request that the above named physician, his/her physician assistants or designees perform such procedures as are, in his/her professional opinion, necessary and desirable. 4.  Any tissue or organs removed in the operation or other procedure may be disposed of by and at the discretion of the [facility], [facility] and [facility] AT [facility] [place]. 5.  I understand that in the event of a medical emergency, I will be transported by local paramedics to [facility] or other [facility] emergency department. 6.  I certify that I have read and fully understand the above consent to operation and/or other procedure. 7.  I acknowledge that my physician has explained sedation/analgesia administration to me including the risks and benefits. I consent to the administration of sedation/analgesia as may be necessary or desirable in the judgement of my physician.     Witness signature: ___________________________________________________ Date:  __5__/__25___/__23_                    Time:  ________ A.M.  P.M. Patient Name:  ______________________________________________________  (please print)      Patient signature:  ___________________________________________________             Relationship to Patient:           []  Parent    Responsible person                          []  Spouse  In case of minor or                    [] Other  _____________   Incompetent name:  __________________________________________________                               (please print)      _____________      Responsible person  In case of minor or  Incompetent signature:  _______________________________________________    Statement of Physician  My signature below affirms that prior to the time of the procedure, I have explained to the patient and/or his/her guardian, the risks and benefits involved in the proposed treatment and any reasonable alternative to the proposed treatment. I have also explained the risks and benefits involved in the refusal of the proposed treatment and have answered the patient's questions.                         Date:  ______/______/_______  Provider                      Signature:  __________________________________________________________       Time:  ___________ A.M    P.M.

## (undated) NOTE — LETTER
Date & Time: 11/20/2023, 7:17 PM  Patient: Jose Ascencio  Encounter Provider(s):    DELIA Acuña       To Whom It May Concern:    Jose Ascencio was seen and treated in our department on 11/20/2023. She can return to work with these limitations: Allow to rest the arm as needed throughout the day .     If you have any questions or concerns, please do not hesitate to call.        _____________________________  Physician/APC Signature